# Patient Record
Sex: MALE | Race: WHITE | URBAN - METROPOLITAN AREA
[De-identification: names, ages, dates, MRNs, and addresses within clinical notes are randomized per-mention and may not be internally consistent; named-entity substitution may affect disease eponyms.]

---

## 2017-11-15 ENCOUNTER — HOSPITAL ENCOUNTER (INPATIENT)
Facility: CLINIC | Age: 77
LOS: 7 days | Discharge: HOME | DRG: 236 | End: 2017-11-22
Attending: SURGERY | Admitting: INTERNAL MEDICINE
Payer: MEDICARE

## 2017-11-15 ENCOUNTER — APPOINTMENT (OUTPATIENT)
Dept: GENERAL RADIOLOGY | Facility: CLINIC | Age: 77
DRG: 236 | End: 2017-11-15
Attending: SURGERY
Payer: MEDICARE

## 2017-11-15 ENCOUNTER — APPOINTMENT (OUTPATIENT)
Dept: GENERAL RADIOLOGY | Facility: CLINIC | Age: 77
DRG: 236 | End: 2017-11-15
Attending: INTERNAL MEDICINE
Payer: MEDICARE

## 2017-11-15 ENCOUNTER — APPOINTMENT (OUTPATIENT)
Dept: CT IMAGING | Facility: CLINIC | Age: 77
DRG: 236 | End: 2017-11-15
Attending: PHYSICIAN ASSISTANT
Payer: MEDICARE

## 2017-11-15 DIAGNOSIS — I25.110 CORONARY ARTERY DISEASE INVOLVING NATIVE CORONARY ARTERY OF NATIVE HEART WITH UNSTABLE ANGINA PECTORIS (H): Primary | ICD-10-CM

## 2017-11-15 DIAGNOSIS — Z95.1 S/P CABG (CORONARY ARTERY BYPASS GRAFT): ICD-10-CM

## 2017-11-15 DIAGNOSIS — I48.0 PAROXYSMAL ATRIAL FIBRILLATION (H): ICD-10-CM

## 2017-11-15 PROBLEM — I25.10 CAD (CORONARY ARTERY DISEASE): Status: ACTIVE | Noted: 2017-11-15

## 2017-11-15 LAB
ALBUMIN SERPL-MCNC: 3.4 G/DL (ref 3.4–5)
ALBUMIN UR-MCNC: 30 MG/DL
ALP SERPL-CCNC: 100 U/L (ref 40–150)
ALT SERPL W P-5'-P-CCNC: 37 U/L (ref 0–70)
ANION GAP SERPL CALCULATED.3IONS-SCNC: 5 MMOL/L (ref 3–14)
ANION GAP SERPL CALCULATED.3IONS-SCNC: 8 MMOL/L (ref 3–14)
ANION GAP SERPL CALCULATED.3IONS-SCNC: 9 MMOL/L (ref 3–14)
APPEARANCE UR: CLEAR
AST SERPL W P-5'-P-CCNC: 26 U/L (ref 0–45)
BASE EXCESS BLDV CALC-SCNC: 3.3 MMOL/L
BASOPHILS # BLD AUTO: 0 10E9/L (ref 0–0.2)
BASOPHILS # BLD AUTO: 0 10E9/L (ref 0–0.2)
BASOPHILS NFR BLD AUTO: 0.1 %
BASOPHILS NFR BLD AUTO: 0.2 %
BILIRUB SERPL-MCNC: 0.5 MG/DL (ref 0.2–1.3)
BILIRUB UR QL STRIP: NEGATIVE
BUN SERPL-MCNC: 12 MG/DL (ref 7–30)
BUN SERPL-MCNC: 12 MG/DL (ref 7–30)
BUN SERPL-MCNC: 14 MG/DL (ref 7–30)
CALCIUM SERPL-MCNC: 8.2 MG/DL (ref 8.5–10.1)
CALCIUM SERPL-MCNC: 8.6 MG/DL (ref 8.5–10.1)
CALCIUM SERPL-MCNC: 8.6 MG/DL (ref 8.5–10.1)
CHLORIDE SERPL-SCNC: 105 MMOL/L (ref 94–109)
CHLORIDE SERPL-SCNC: 107 MMOL/L (ref 94–109)
CHLORIDE SERPL-SCNC: 108 MMOL/L (ref 94–109)
CO2 SERPL-SCNC: 25 MMOL/L (ref 20–32)
CO2 SERPL-SCNC: 25 MMOL/L (ref 20–32)
CO2 SERPL-SCNC: 29 MMOL/L (ref 20–32)
COLOR UR AUTO: ABNORMAL
CREAT SERPL-MCNC: 0.69 MG/DL (ref 0.66–1.25)
CREAT SERPL-MCNC: 0.75 MG/DL (ref 0.66–1.25)
CREAT SERPL-MCNC: 0.79 MG/DL (ref 0.66–1.25)
DIFFERENTIAL METHOD BLD: ABNORMAL
DIFFERENTIAL METHOD BLD: ABNORMAL
EOSINOPHIL # BLD AUTO: 0.1 10E9/L (ref 0–0.7)
EOSINOPHIL # BLD AUTO: 0.1 10E9/L (ref 0–0.7)
EOSINOPHIL NFR BLD AUTO: 1.3 %
EOSINOPHIL NFR BLD AUTO: 1.8 %
ERYTHROCYTE [DISTWIDTH] IN BLOOD BY AUTOMATED COUNT: 13 % (ref 10–15)
ERYTHROCYTE [DISTWIDTH] IN BLOOD BY AUTOMATED COUNT: 13.1 % (ref 10–15)
ERYTHROCYTE [DISTWIDTH] IN BLOOD BY AUTOMATED COUNT: 13.1 % (ref 10–15)
GFR SERPL CREATININE-BSD FRML MDRD: >90 ML/MIN/1.7M2
GLUCOSE BLDC GLUCOMTR-MCNC: 93 MG/DL (ref 70–99)
GLUCOSE SERPL-MCNC: 106 MG/DL (ref 70–99)
GLUCOSE SERPL-MCNC: 113 MG/DL (ref 70–99)
GLUCOSE SERPL-MCNC: 127 MG/DL (ref 70–99)
GLUCOSE UR STRIP-MCNC: NEGATIVE MG/DL
HCO3 BLDV-SCNC: 28 MMOL/L (ref 21–28)
HCT VFR BLD AUTO: 36.2 % (ref 40–53)
HCT VFR BLD AUTO: 38.4 % (ref 40–53)
HCT VFR BLD AUTO: 38.8 % (ref 40–53)
HGB BLD-MCNC: 12 G/DL (ref 13.3–17.7)
HGB BLD-MCNC: 12.7 G/DL (ref 13.3–17.7)
HGB BLD-MCNC: 12.8 G/DL (ref 13.3–17.7)
HGB UR QL STRIP: ABNORMAL
IMM GRANULOCYTES # BLD: 0 10E9/L (ref 0–0.4)
IMM GRANULOCYTES # BLD: 0 10E9/L (ref 0–0.4)
IMM GRANULOCYTES NFR BLD: 0.1 %
IMM GRANULOCYTES NFR BLD: 0.3 %
INR PPP: 1.04 (ref 0.86–1.14)
INR PPP: 1.04 (ref 0.86–1.14)
INTERPRETATION ECG - MUSE: NORMAL
INTERPRETATION ECG - MUSE: NORMAL
KETONES UR STRIP-MCNC: 5 MG/DL
LEUKOCYTE ESTERASE UR QL STRIP: NEGATIVE
LMWH PPP CHRO-ACNC: 0.24 IU/ML
LMWH PPP CHRO-ACNC: 0.3 IU/ML
LMWH PPP CHRO-ACNC: 0.41 IU/ML
LYMPHOCYTES # BLD AUTO: 1.4 10E9/L (ref 0.8–5.3)
LYMPHOCYTES # BLD AUTO: 1.8 10E9/L (ref 0.8–5.3)
LYMPHOCYTES NFR BLD AUTO: 15.1 %
LYMPHOCYTES NFR BLD AUTO: 23 %
MAGNESIUM SERPL-MCNC: 2 MG/DL (ref 1.6–2.3)
MCH RBC QN AUTO: 30.4 PG (ref 26.5–33)
MCHC RBC AUTO-ENTMCNC: 33 G/DL (ref 31.5–36.5)
MCHC RBC AUTO-ENTMCNC: 33.1 G/DL (ref 31.5–36.5)
MCHC RBC AUTO-ENTMCNC: 33.1 G/DL (ref 31.5–36.5)
MCV RBC AUTO: 92 FL (ref 78–100)
MONOCYTES # BLD AUTO: 0.7 10E9/L (ref 0–1.3)
MONOCYTES # BLD AUTO: 0.7 10E9/L (ref 0–1.3)
MONOCYTES NFR BLD AUTO: 7.8 %
MONOCYTES NFR BLD AUTO: 9.3 %
MUCOUS THREADS #/AREA URNS LPF: PRESENT /LPF
NEUTROPHILS # BLD AUTO: 5.1 10E9/L (ref 1.6–8.3)
NEUTROPHILS # BLD AUTO: 7 10E9/L (ref 1.6–8.3)
NEUTROPHILS NFR BLD AUTO: 65.5 %
NEUTROPHILS NFR BLD AUTO: 75.5 %
NITRATE UR QL: NEGATIVE
NRBC # BLD AUTO: 0 10*3/UL
NRBC # BLD AUTO: 0 10*3/UL
NRBC BLD AUTO-RTO: 0 /100
NRBC BLD AUTO-RTO: 0 /100
O2/TOTAL GAS SETTING VFR VENT: NORMAL %
OXYHGB MFR BLDV: 79 %
PCO2 BLDV: 43 MM HG (ref 40–50)
PH BLDV: 7.43 PH (ref 7.32–7.43)
PH UR STRIP: 6 PH (ref 5–7)
PHENYTOIN SERPL-MCNC: 10.4 MG/L (ref 10–20)
PHOSPHATE SERPL-MCNC: 3.4 MG/DL (ref 2.5–4.5)
PLATELET # BLD AUTO: 158 10E9/L (ref 150–450)
PLATELET # BLD AUTO: 168 10E9/L (ref 150–450)
PLATELET # BLD AUTO: 177 10E9/L (ref 150–450)
PO2 BLDV: 44 MM HG (ref 25–47)
POTASSIUM SERPL-SCNC: 3.5 MMOL/L (ref 3.4–5.3)
POTASSIUM SERPL-SCNC: 3.7 MMOL/L (ref 3.4–5.3)
POTASSIUM SERPL-SCNC: 4 MMOL/L (ref 3.4–5.3)
PROT SERPL-MCNC: 7 G/DL (ref 6.8–8.8)
RBC # BLD AUTO: 3.95 10E12/L (ref 4.4–5.9)
RBC # BLD AUTO: 4.18 10E12/L (ref 4.4–5.9)
RBC # BLD AUTO: 4.21 10E12/L (ref 4.4–5.9)
RBC #/AREA URNS AUTO: 171 /HPF (ref 0–2)
SODIUM SERPL-SCNC: 139 MMOL/L (ref 133–144)
SODIUM SERPL-SCNC: 140 MMOL/L (ref 133–144)
SODIUM SERPL-SCNC: 141 MMOL/L (ref 133–144)
SOURCE: ABNORMAL
SP GR UR STRIP: 1.03 (ref 1–1.03)
TROPONIN I SERPL-MCNC: 0.02 UG/L (ref 0–0.04)
UROBILINOGEN UR STRIP-MCNC: NORMAL MG/DL (ref 0–2)
WBC # BLD AUTO: 10 10E9/L (ref 4–11)
WBC # BLD AUTO: 7.7 10E9/L (ref 4–11)
WBC # BLD AUTO: 9.3 10E9/L (ref 4–11)
WBC #/AREA URNS AUTO: 0 /HPF (ref 0–2)

## 2017-11-15 PROCEDURE — 80185 ASSAY OF PHENYTOIN TOTAL: CPT | Performed by: STUDENT IN AN ORGANIZED HEALTH CARE EDUCATION/TRAINING PROGRAM

## 2017-11-15 PROCEDURE — 85520 HEPARIN ASSAY: CPT | Performed by: THORACIC SURGERY (CARDIOTHORACIC VASCULAR SURGERY)

## 2017-11-15 PROCEDURE — 84484 ASSAY OF TROPONIN QUANT: CPT | Performed by: STUDENT IN AN ORGANIZED HEALTH CARE EDUCATION/TRAINING PROGRAM

## 2017-11-15 PROCEDURE — 25000125 ZZHC RX 250: Performed by: INTERNAL MEDICINE

## 2017-11-15 PROCEDURE — 80048 BASIC METABOLIC PNL TOTAL CA: CPT | Performed by: THORACIC SURGERY (CARDIOTHORACIC VASCULAR SURGERY)

## 2017-11-15 PROCEDURE — 84100 ASSAY OF PHOSPHORUS: CPT | Performed by: THORACIC SURGERY (CARDIOTHORACIC VASCULAR SURGERY)

## 2017-11-15 PROCEDURE — 40000275 ZZH STATISTIC RCP TIME EA 10 MIN

## 2017-11-15 PROCEDURE — 40000986 XR CHEST PORT 1 VW

## 2017-11-15 PROCEDURE — 40000076 ZZH STATISTIC IABP MONITORING

## 2017-11-15 PROCEDURE — 82805 BLOOD GASES W/O2 SATURATION: CPT | Performed by: INTERNAL MEDICINE

## 2017-11-15 PROCEDURE — 25000132 ZZH RX MED GY IP 250 OP 250 PS 637: Mod: GY | Performed by: INTERNAL MEDICINE

## 2017-11-15 PROCEDURE — 99223 1ST HOSP IP/OBS HIGH 75: CPT | Performed by: INTERNAL MEDICINE

## 2017-11-15 PROCEDURE — 85610 PROTHROMBIN TIME: CPT | Performed by: STUDENT IN AN ORGANIZED HEALTH CARE EDUCATION/TRAINING PROGRAM

## 2017-11-15 PROCEDURE — 83735 ASSAY OF MAGNESIUM: CPT | Performed by: STUDENT IN AN ORGANIZED HEALTH CARE EDUCATION/TRAINING PROGRAM

## 2017-11-15 PROCEDURE — 36569 INSJ PICC 5 YR+ W/O IMAGING: CPT

## 2017-11-15 PROCEDURE — 93010 ELECTROCARDIOGRAM REPORT: CPT | Mod: 76 | Performed by: INTERNAL MEDICINE

## 2017-11-15 PROCEDURE — 85520 HEPARIN ASSAY: CPT | Performed by: INTERNAL MEDICINE

## 2017-11-15 PROCEDURE — 25000128 H RX IP 250 OP 636: Performed by: INTERNAL MEDICINE

## 2017-11-15 PROCEDURE — 93005 ELECTROCARDIOGRAM TRACING: CPT

## 2017-11-15 PROCEDURE — 25000132 ZZH RX MED GY IP 250 OP 250 PS 637: Mod: GY | Performed by: STUDENT IN AN ORGANIZED HEALTH CARE EDUCATION/TRAINING PROGRAM

## 2017-11-15 PROCEDURE — 85027 COMPLETE CBC AUTOMATED: CPT | Performed by: STUDENT IN AN ORGANIZED HEALTH CARE EDUCATION/TRAINING PROGRAM

## 2017-11-15 PROCEDURE — S0138 FINASTERIDE, 5 MG: HCPCS | Performed by: STUDENT IN AN ORGANIZED HEALTH CARE EDUCATION/TRAINING PROGRAM

## 2017-11-15 PROCEDURE — 85025 COMPLETE CBC W/AUTO DIFF WBC: CPT | Performed by: STUDENT IN AN ORGANIZED HEALTH CARE EDUCATION/TRAINING PROGRAM

## 2017-11-15 PROCEDURE — 36415 COLL VENOUS BLD VENIPUNCTURE: CPT | Performed by: INTERNAL MEDICINE

## 2017-11-15 PROCEDURE — 80053 COMPREHEN METABOLIC PANEL: CPT | Performed by: STUDENT IN AN ORGANIZED HEALTH CARE EDUCATION/TRAINING PROGRAM

## 2017-11-15 PROCEDURE — 40000281 ZZH STATISTIC TRANSPORT TIME EA 15 MIN

## 2017-11-15 PROCEDURE — 25000128 H RX IP 250 OP 636: Performed by: STUDENT IN AN ORGANIZED HEALTH CARE EDUCATION/TRAINING PROGRAM

## 2017-11-15 PROCEDURE — 40000141 ZZH STATISTIC PERIPHERAL IV START W/O US GUIDANCE

## 2017-11-15 PROCEDURE — 25000128 H RX IP 250 OP 636

## 2017-11-15 PROCEDURE — 83735 ASSAY OF MAGNESIUM: CPT | Performed by: THORACIC SURGERY (CARDIOTHORACIC VASCULAR SURGERY)

## 2017-11-15 PROCEDURE — 81001 URINALYSIS AUTO W/SCOPE: CPT | Performed by: STUDENT IN AN ORGANIZED HEALTH CARE EDUCATION/TRAINING PROGRAM

## 2017-11-15 PROCEDURE — 80048 BASIC METABOLIC PNL TOTAL CA: CPT | Performed by: STUDENT IN AN ORGANIZED HEALTH CARE EDUCATION/TRAINING PROGRAM

## 2017-11-15 PROCEDURE — 36415 COLL VENOUS BLD VENIPUNCTURE: CPT | Performed by: STUDENT IN AN ORGANIZED HEALTH CARE EDUCATION/TRAINING PROGRAM

## 2017-11-15 PROCEDURE — 85610 PROTHROMBIN TIME: CPT | Performed by: INTERNAL MEDICINE

## 2017-11-15 PROCEDURE — 84100 ASSAY OF PHOSPHORUS: CPT | Performed by: STUDENT IN AN ORGANIZED HEALTH CARE EDUCATION/TRAINING PROGRAM

## 2017-11-15 PROCEDURE — 20000004 ZZH R&B ICU UMMC

## 2017-11-15 PROCEDURE — 85025 COMPLETE CBC W/AUTO DIFF WBC: CPT | Performed by: THORACIC SURGERY (CARDIOTHORACIC VASCULAR SURGERY)

## 2017-11-15 PROCEDURE — A9270 NON-COVERED ITEM OR SERVICE: HCPCS | Mod: GY | Performed by: STUDENT IN AN ORGANIZED HEALTH CARE EDUCATION/TRAINING PROGRAM

## 2017-11-15 PROCEDURE — 71250 CT THORAX DX C-: CPT

## 2017-11-15 PROCEDURE — A9270 NON-COVERED ITEM OR SERVICE: HCPCS | Mod: GY | Performed by: INTERNAL MEDICINE

## 2017-11-15 PROCEDURE — 27210197 ZZH KIT POWER PICC TRIPLE LUMEN

## 2017-11-15 PROCEDURE — 00000146 ZZHCL STATISTIC GLUCOSE BY METER IP

## 2017-11-15 PROCEDURE — 85520 HEPARIN ASSAY: CPT | Performed by: STUDENT IN AN ORGANIZED HEALTH CARE EDUCATION/TRAINING PROGRAM

## 2017-11-15 RX ORDER — TAMSULOSIN HYDROCHLORIDE 0.4 MG/1
0.4 CAPSULE ORAL DAILY
Status: DISCONTINUED | OUTPATIENT
Start: 2017-11-15 | End: 2017-11-15

## 2017-11-15 RX ORDER — LIDOCAINE 40 MG/G
CREAM TOPICAL
Status: DISCONTINUED | OUTPATIENT
Start: 2017-11-15 | End: 2017-11-17

## 2017-11-15 RX ORDER — ATORVASTATIN CALCIUM 80 MG/1
80 TABLET, FILM COATED ORAL DAILY
Status: DISCONTINUED | OUTPATIENT
Start: 2017-11-15 | End: 2017-11-17

## 2017-11-15 RX ORDER — HEPARIN SODIUM,PORCINE 10 UNIT/ML
2-5 VIAL (ML) INTRAVENOUS
Status: DISCONTINUED | OUTPATIENT
Start: 2017-11-15 | End: 2017-11-17

## 2017-11-15 RX ORDER — ASPIRIN 81 MG/1
81 TABLET, CHEWABLE ORAL DAILY
Status: ON HOLD | COMMUNITY
End: 2017-11-22

## 2017-11-15 RX ORDER — POTASSIUM CHLORIDE 1.5 G/1.58G
20-40 POWDER, FOR SOLUTION ORAL
Status: DISCONTINUED | OUTPATIENT
Start: 2017-11-15 | End: 2017-11-17

## 2017-11-15 RX ORDER — FINASTERIDE 5 MG/1
5 TABLET, FILM COATED ORAL DAILY
Status: DISCONTINUED | OUTPATIENT
Start: 2017-11-15 | End: 2017-11-17

## 2017-11-15 RX ORDER — HEPARIN SODIUM,PORCINE 10 UNIT/ML
5-10 VIAL (ML) INTRAVENOUS
Status: DISCONTINUED | OUTPATIENT
Start: 2017-11-15 | End: 2017-11-17

## 2017-11-15 RX ORDER — POTASSIUM CL/LIDO/0.9 % NACL 10MEQ/0.1L
10 INTRAVENOUS SOLUTION, PIGGYBACK (ML) INTRAVENOUS
Status: DISCONTINUED | OUTPATIENT
Start: 2017-11-15 | End: 2017-11-17

## 2017-11-15 RX ORDER — POTASSIUM CHLORIDE 7.45 MG/ML
10 INJECTION INTRAVENOUS
Status: DISCONTINUED | OUTPATIENT
Start: 2017-11-15 | End: 2017-11-17

## 2017-11-15 RX ORDER — CALCIUM CARBONATE 500 MG/1
500 TABLET, CHEWABLE ORAL 2 TIMES DAILY PRN
Status: DISCONTINUED | OUTPATIENT
Start: 2017-11-15 | End: 2017-11-17

## 2017-11-15 RX ORDER — NALOXONE HYDROCHLORIDE 0.4 MG/ML
.1-.4 INJECTION, SOLUTION INTRAMUSCULAR; INTRAVENOUS; SUBCUTANEOUS
Status: DISCONTINUED | OUTPATIENT
Start: 2017-11-15 | End: 2017-11-17

## 2017-11-15 RX ORDER — PROCHLORPERAZINE 25 MG
12.5 SUPPOSITORY, RECTAL RECTAL EVERY 12 HOURS PRN
Status: DISCONTINUED | OUTPATIENT
Start: 2017-11-15 | End: 2017-11-17

## 2017-11-15 RX ORDER — AMOXICILLIN 250 MG
1 CAPSULE ORAL 2 TIMES DAILY
Status: DISCONTINUED | OUTPATIENT
Start: 2017-11-15 | End: 2017-11-17

## 2017-11-15 RX ORDER — HEPARIN SODIUM,PORCINE 10 UNIT/ML
5-10 VIAL (ML) INTRAVENOUS EVERY 24 HOURS
Status: DISCONTINUED | OUTPATIENT
Start: 2017-11-15 | End: 2017-11-17

## 2017-11-15 RX ORDER — PHENYTOIN SODIUM 100 MG/1
400 CAPSULE, EXTENDED RELEASE ORAL AT BEDTIME
Status: DISCONTINUED | OUTPATIENT
Start: 2017-11-15 | End: 2017-11-17

## 2017-11-15 RX ORDER — POLYETHYLENE GLYCOL 3350 17 G/17G
17 POWDER, FOR SOLUTION ORAL DAILY PRN
Status: DISCONTINUED | OUTPATIENT
Start: 2017-11-15 | End: 2017-11-17

## 2017-11-15 RX ORDER — POTASSIUM CHLORIDE 750 MG/1
20-40 TABLET, EXTENDED RELEASE ORAL
Status: DISCONTINUED | OUTPATIENT
Start: 2017-11-15 | End: 2017-11-17

## 2017-11-15 RX ORDER — ASPIRIN 81 MG/1
81 TABLET ORAL DAILY
Status: DISCONTINUED | OUTPATIENT
Start: 2017-11-15 | End: 2017-11-17

## 2017-11-15 RX ORDER — SODIUM CHLORIDE 9 MG/ML
INJECTION, SOLUTION INTRAVENOUS CONTINUOUS
Status: ACTIVE | OUTPATIENT
Start: 2017-11-15 | End: 2017-11-15

## 2017-11-15 RX ORDER — TAMSULOSIN HYDROCHLORIDE 0.4 MG/1
0.4 CAPSULE ORAL AT BEDTIME
Status: DISCONTINUED | OUTPATIENT
Start: 2017-11-15 | End: 2017-11-17

## 2017-11-15 RX ORDER — POTASSIUM CHLORIDE 29.8 MG/ML
20 INJECTION INTRAVENOUS
Status: DISCONTINUED | OUTPATIENT
Start: 2017-11-15 | End: 2017-11-17

## 2017-11-15 RX ORDER — CALCIUM CARBONATE 500 MG/1
TABLET, CHEWABLE ORAL
COMMUNITY

## 2017-11-15 RX ORDER — MAGNESIUM SULFATE HEPTAHYDRATE 40 MG/ML
4 INJECTION, SOLUTION INTRAVENOUS EVERY 4 HOURS PRN
Status: DISCONTINUED | OUTPATIENT
Start: 2017-11-15 | End: 2017-11-17

## 2017-11-15 RX ORDER — NITROGLYCERIN 20 MG/100ML
0.07-2 INJECTION INTRAVENOUS CONTINUOUS
Status: DISCONTINUED | OUTPATIENT
Start: 2017-11-15 | End: 2017-11-17

## 2017-11-15 RX ORDER — NITROGLYCERIN 20 MG/100ML
INJECTION INTRAVENOUS
Status: COMPLETED
Start: 2017-11-15 | End: 2017-11-15

## 2017-11-15 RX ORDER — HEPARIN SODIUM 10000 [USP'U]/100ML
0-3500 INJECTION, SOLUTION INTRAVENOUS CONTINUOUS
Status: DISCONTINUED | OUTPATIENT
Start: 2017-11-15 | End: 2017-11-17

## 2017-11-15 RX ORDER — ACETAMINOPHEN 325 MG/1
650 TABLET ORAL EVERY 4 HOURS PRN
Status: DISCONTINUED | OUTPATIENT
Start: 2017-11-15 | End: 2017-11-17

## 2017-11-15 RX ORDER — AMOXICILLIN 250 MG
2 CAPSULE ORAL 2 TIMES DAILY
Status: DISCONTINUED | OUTPATIENT
Start: 2017-11-15 | End: 2017-11-17

## 2017-11-15 RX ORDER — PROCHLORPERAZINE MALEATE 5 MG
5 TABLET ORAL EVERY 6 HOURS PRN
Status: DISCONTINUED | OUTPATIENT
Start: 2017-11-15 | End: 2017-11-17

## 2017-11-15 RX ADMIN — POTASSIUM CHLORIDE 20 MEQ: 1.5 POWDER, FOR SOLUTION ORAL at 18:26

## 2017-11-15 RX ADMIN — FINASTERIDE 5 MG: 5 TABLET, FILM COATED ORAL at 07:56

## 2017-11-15 RX ADMIN — SODIUM CHLORIDE: 9 INJECTION, SOLUTION INTRAVENOUS at 08:06

## 2017-11-15 RX ADMIN — SENNOSIDES AND DOCUSATE SODIUM 1 TABLET: 8.6; 5 TABLET ORAL at 20:54

## 2017-11-15 RX ADMIN — ACETAMINOPHEN 650 MG: 325 TABLET, FILM COATED ORAL at 17:07

## 2017-11-15 RX ADMIN — HEPARIN SODIUM 950 UNITS/HR: 10000 INJECTION, SOLUTION INTRAVENOUS at 00:42

## 2017-11-15 RX ADMIN — ACETAMINOPHEN 650 MG: 325 TABLET, FILM COATED ORAL at 21:58

## 2017-11-15 RX ADMIN — TAMSULOSIN HYDROCHLORIDE 0.4 MG: 0.4 CAPSULE ORAL at 21:58

## 2017-11-15 RX ADMIN — ATORVASTATIN CALCIUM 80 MG: 80 TABLET, FILM COATED ORAL at 07:54

## 2017-11-15 RX ADMIN — NITROGLYCERIN 0.3 MCG/KG/MIN: 20 INJECTION INTRAVENOUS at 00:42

## 2017-11-15 RX ADMIN — ASPIRIN 81 MG: 81 TABLET, COATED ORAL at 07:55

## 2017-11-15 RX ADMIN — ACETAMINOPHEN 650 MG: 325 TABLET, FILM COATED ORAL at 07:54

## 2017-11-15 RX ADMIN — OXYCODONE HYDROCHLORIDE 5 MG: 5 TABLET ORAL at 08:30

## 2017-11-15 RX ADMIN — SODIUM CHLORIDE 250 ML: 9 INJECTION, SOLUTION INTRAVENOUS at 04:45

## 2017-11-15 RX ADMIN — PHENYTOIN SODIUM 400 MG: 100 CAPSULE ORAL at 21:58

## 2017-11-15 RX ADMIN — SODIUM CHLORIDE 250 ML: 9 INJECTION, SOLUTION INTRAVENOUS at 06:36

## 2017-11-15 RX ADMIN — ACETAMINOPHEN 650 MG: 325 TABLET, FILM COATED ORAL at 01:15

## 2017-11-15 RX ADMIN — OMEPRAZOLE 40 MG: 20 CAPSULE, DELAYED RELEASE ORAL at 07:54

## 2017-11-15 RX ADMIN — Medication 3 ML: at 09:54

## 2017-11-15 ASSESSMENT — ACTIVITIES OF DAILY LIVING (ADL)
COGNITION: 0 - NO COGNITION ISSUES REPORTED
DRESS: 0-->INDEPENDENT
BATHING: 0-->INDEPENDENT
RETIRED_COMMUNICATION: 0-->UNDERSTANDS/COMMUNICATES WITHOUT DIFFICULTY
AMBULATION: 0-->INDEPENDENT
RETIRED_EATING: 0-->INDEPENDENT
TRANSFERRING: 0-->INDEPENDENT
FALL_HISTORY_WITHIN_LAST_SIX_MONTHS: NO
SWALLOWING: 0-->SWALLOWS FOODS/LIQUIDS WITHOUT DIFFICULTY
TOILETING: 0-->INDEPENDENT

## 2017-11-15 ASSESSMENT — PAIN DESCRIPTION - DESCRIPTORS
DESCRIPTORS: SORE

## 2017-11-15 NOTE — CONSULTS
Cardiothoracic Surgery Consult Note    Reason for Consult: Left main coronary artery disease, CABG    HPI: Patient is a 77 year old male with a history of seizure disorder (no sz >20 yrs), BPH, dyslipidemia and Rob's esophagus who was admitted to [facility] for abnormal stress echo, underwent coronary angiogram which showed severe left main and was transferred to [facility] for consideration of CABG. Prior to transfer, patient was started on heparin and nitro drips and a balloon pump was placed. Patient received a ticagelor load, last dose was 11/14    Patient presented with substernal chest pain worse with exertion. He currently denies any chest pain. He reports his pain is worse with minimal exertion. He is usually very active without limitations. He denies any associated shortness of breath, dizziness, diaphoresis, nausea, vomiting, lightheadedness and dizziness. Patient's sister and father both have significant heart disease. He has never smoked.     Per stress test report, LVEF 50% at rest, LVEF dropped to 35% with stress, along with mid-apical, anterior, mid inferolateral, midanterolateral, apical septal and apical lateral wall motion abnormalities.       PMH:  Past Medical History:   Diagnosis Date     BPH (benign prostatic hyperplasia)      Cholelithiasis      Seizure disorder (H)        PSH:  Past Surgical History:   Procedure Laterality Date     APPENDECTOMY       CHOLECYSTECTOMY       COLONOSCOPY  03/16/2017     UPPER GI ENDOSCOPY  03/16/2017     VASECTOMY         FH:  Family History   Problem Relation Age of Onset     Hypertension Mother      Hyperlipidemia Mother      CEREBROVASCULAR DISEASE Father      HEART DISEASE Father      Prostate Cancer Father      Hypertension Father      HEART DISEASE Sister      Rheumatic heart disease     HEART DISEASE Maternal Grandfather      CANCER Paternal Uncle      CANCER Paternal Aunt      HEART DISEASE Maternal Uncle      HEART DISEASE Maternal Uncle         SH:  Social History     Social History     Marital status:      Spouse name: Soo     Number of children: 3     Years of education: 10     Occupational History     [employer]      Social History Main Topics     Smoking status: Never Smoker     Smokeless tobacco: Never Used     Alcohol use No     Drug use: No     Sexual activity: Not Asked     Other Topics Concern     None     Social History Narrative     None       Home Meds:  No prescriptions prior to admission.       Allergies:  No Known Allergies    ROS: No fevers, chills, or night sweats. No recent weight changes.  No new visual or hearing complaints. No sore throat or nasal congestion. No SOB, cough, or wheezing.  No CP, palpitations, syncopal episodes, or dependent edema.  No new muscle or joint pain.  No weakness, numbness, or tingling of extremities. No new headaches. No changes in memory, mood, or affect.  No new rashes or bruises.     Physical Exam:  Temp:  [98.9  F (37.2  C)] 98.9  F (37.2  C)  Heart Rate:  [49-70] 54  Resp:  [10-37] 16  BP: ()/(45-83) 108/45  SpO2:  [94 %-99 %] 98 %  Gen: NAD, resting comfortably in bed, conversational  HEENT: normocephalic, atraumatic cranium, EOMI, sclerae anicteric. Oral mucosa pink and moist, no tonsillar edema or erythema, midline trachea, nonpalpable thyroid  Lungs: Lungs CTA in all fields, no wheezing or rhonchi  CV: RRR, S1S2 normal, no murmur. Radial pulses and DP pulses symmetric. No dependent edema. Extensive varicosities of the right lower extremities.   Abd: positive normal pitched bowel sounds, overall soft and non distended, nontender, no hepatosplenomegaly, no masses/guarding/rebound tenderness.   Musculoskeletal: grossly intact, strength 5/5 upper and lower extremities  Neuro: AOx3, CN II-VII grossly intact, sensation/motor intact in upper and lower extremities  Mental: normal mood and affect, regular rate of speech    Labs:  ABG No lab results found in last 7 days.  CBC  Recent  Labs  Lab 11/15/17  0346 11/15/17  0040   WBC 10.0 9.3   HGB 12.7* 12.8*    168     BMP  Recent Labs  Lab 11/15/17  0346 11/15/17  0040    139   POTASSIUM 4.0 3.7   CHLORIDE 108 105   CO2 25 25   BUN 14 12   CR 0.79 0.75   * 113*     LFT  Recent Labs  Lab 11/15/17  0303 11/15/17  0040   AST  --  26   ALT  --  37   ALKPHOS  --  100   BILITOTAL  --  0.5   ALBUMIN  --  3.4   INR 1.04 1.04     PancreasNo lab results found in last 7 days.    Imaging:  CXR 11/15:  Findings:   Portable AP supine view of the chest. New right-sided PICC projecting  toward the high SVC. Intraaorta balloon pump is 2.4 cm above the  shirley at the level of aortic arch. No pleural effusion or  pneumothorax. Cardiomediastinal silhouette appears within normal  limits. Trachea midline. No acute airspace opacity. Surgical clips in  the right upper quadrant.       Impression:   1. Right-sided PICC projecting over the high SVC.  2. IABP marker is 2.4 cm above the shirley at the level of the aortic  Arch.    Stress results: Duration of exercise was 7 min and 10 sec. Maximal work rate was 5.1 METs. Target heart rate is 120 bpm. Maximal heart rate during stress was 101 bpm (71%) of maximal predicted heart rate). The rate-pressure product for the peak heart rate and blood pressure was 85143. The patient experienced 5 out of 10 chest tightness during stress. The stress test was terminated due to moderate chest discomfort and electrocardiographic changes.  ECG conclusions: The stress ECG was consistent with myocardial ischemia.   Baseline: There was severe hypokinesis of the apical septal, apical lateral, and apical wall(s).  Peak stress: There was severe hypokinesis of mid-apical anterior, mid inferolateral, mid anterolateral, apical septal, and apical lateral wall(s).   The left ventricle was mildly enlarged. There was moderate reduction in LV function. Estimated left ventricular ejection fraction was 35%.   Arrhythmia during stress:  isolated atrial premature beats, isolated premature ventricular beats, and pairs of premature ventricular beats.      Imaging data  Baseline: There was severe hypokinesis of the apical septal, apical lateral, and apical wall(s). Left ventricular size was normal. Estimated left ventricular ejection fraction was 50%.   Peak stress: There was severe hypokinesis of the mid-apical anterior, mid inferolateral, mid anterolateral, apical septal, and apical lateral wall(s). The left ventricle was mildly enlarged. There was a moderate reduction in LV function. Estimated left ventricular ejection fraction was 35%.      Last Angiogram:   11/14/2017 @ [place]:   ((Reviewed by cardiology fellow))  Right dominant system.   95% stenosis of distal left main extending into Lcx w/ MENDOZA 3 flow  95% stenosis of ostium of LAD  Mild-moderate calcification of pLAD  Moderate disease of pLAD, and moderate diffuse disease of m/dLAD        ASSESSMENT/PLAN: Patient is a 77 year old male with a history of seizure disorder (no sz >20 yrs), BPH, dyslipidemia and Rob's esophagus presented with to [facility] and was found to have critical left main stenosis without significant RCA disease. IABP was placed and patient was transferred to [facility]. Patient currently admitted under cardiology service and CVTS was consulted for consideration of CABG.  - Plan for OR on Friday late morning with Dr. Thompson and Dr. Sanchez, need to wait at least three days since patient received ticagelor dose  - Will obtain vein mapping, bilateral carotid duplex US and CT chest w/o contrast  - Also recommend obtaining formal echocardiogram to assess valves and EF  - Pre-op orders to be placed, consent to be obtained.   - Other cares per primary team  - Thank you for the opportunity to participate in the care of this patient.    Patient and plan discussed with attending, Dr. David Thompson.      Fred Gonzalez PA-C  Cardiothoracic Surgery  November 15, 2017 9:31 AM    p: [phone]     Patient seen and examined. Investigations reviewed. I agree with the findings outlined in the advanced care provider's note. Will proceed with coronary artery bypass grafting on Friday. Delay in surgery is due to Brilinta load. Risks and benefits of surgery discussed with patient including risks of death, bleeding, stroke, infection, renal failure. He understands and is willing to proceed with surgery. I spent a total of 45 minutes examining the patient, reviewing investigations and therapeutic counseling.

## 2017-11-15 NOTE — PHARMACY-ADMISSION MEDICATION HISTORY
Admission medication history interview status for the 11/15/2017 admission is complete. See Epic admission navigator for allergy information, pharmacy, prior to admission medications and immunization status.     Medication history interview sources:  Patient, [facility] faxed list    Changes made to PTA medication list (reason)  Added: All medications (no hx in chart)  Deleted: None  Changed: None    Additional medication history information (including reliability of information, actions taken by pharmacist):    Patient alert/oriented.  Knows the names of the meds he takes, but not doses.  He recently transferred from a hospital in [place] to [facility] early this week.    1. Verified allergies. NKDA.  2. All scheduled meds last taken on Monday due to hospital admission in [place].   3. Was prescribed 3 new cardiac meds from [place] MD Jordan Hatfield.  They have not been filled/picked up.  4. Had been using TUMs regularly (4-6 times daily) before hospital admission.  5. Recalled having a flu shot this year; [facility] lists 10/16/17.     Prior to Admission medications    Medication Sig Last Dose Taking? Auth Provider   METOPROLOL TARTRATE PO Take 12.5 mg by mouth 2 times daily Not started No Unknown, Entered By History   ATORVASTATIN CALCIUM PO Take 80 mg by mouth daily Not started No Unknown, Entered By History   aspirin 81 MG chewable tablet Take 81 mg by mouth daily Not started No Unknown, Entered By History   OMEPRAZOLE PO Take 40 mg by mouth every morning Take 30 minutes before breakfast 11/13/2017 at AM Yes Unknown, Entered By History   FINASTERIDE PO Take 1 mg by mouth daily 11/13/2017 at Unknown time Yes Unknown, Entered By History   TAMSULOSIN HCL PO Take 0.4 mg by mouth At Bedtime For urination 11/12/2017 at HS Yes Unknown, Entered By History   PHENYTOIN SODIUM EXTENDED PO Take 400 mg by mouth daily 11/13/2017 at Unknown time Yes Unknown, Entered By History   calcium carbonate (TUMS) 500 MG chewable  tablet Take 1 tablet (500 mg) by mouth 4-6 times daily as needed for reflux 11/13/2017 at Unknown time Yes Unknown, Entered By History     Medication history completed by:     Ghazal Kaplan  Pharmacy APPE Student

## 2017-11-15 NOTE — LETTER
Transition Communication Hand-off for Care Transitions to Next Level of Care Provider    Name: Luis Eduardo Oliver  MRN #: [id number]  Primary Care Provider: Criselda Santacruz     Primary Clinic: [place] [address] [address] [facility] [address]     Reason for Hospitalization:  S/P CABG x 2 [Z95.1]  Admit Date/Time: 11/15/2017 12:03 AM  Discharge Date: 11/22/17    Payor Source: Payor: MEDICARE / Plan: MEDICARE / Product Type: Medicare /            Reason for Communication Hand-off Referral: Fragility    Discharge Plan:     Home with outpatient cardiac rehab    Discharge Needs Assessment:  Needs       Most Recent Value    Equipment Currently Used at Home none    Transportation Available car, family or friend will provide            Follow-up plan:  No future appointments.    Any outstanding tests or procedures:        Referrals     Future Labs/Procedures    CARDIAC REHAB REFERRAL     Comments:    Your provider has referred you to:  [facility]  [address] [facility]  Phone #: [phone]    Cardiac Rehab Referral     Comments:    Your provider has referred you to: PREFERRED PROVIDERS in [facility]          Please see attached AVS for changes in medical plan of care and needs.  Sophia Nathan, RN, MSN, PHN  RNCC  PH: [phone]  Pager: [phone]        AVS/Discharge Summary is the source of truth; this is a helpful guide for improved communication of patient story

## 2017-11-15 NOTE — H&P
Cardiology History and Physical    Patient Name: Luis Eduardo Oliver MRN# [id number]   Age: 77 year old YOB: 1940     Date of Admission:11/15/2017    Primary care provider: No primary care provider on file.  Date of Service: 11/15/2017  Admitting Team: [facility]         Assessment and Plan:   Luis Eduardo Oliver is a 77 yr old man pmh of seizure disorder (no sz >20 yrs), BPH, and Rob's esophagus who was admitted to [facility] for abnormal stress echo, now transferred for consideration for CABG in setting of severe LM disease.     # Unstable angina  Severe left main disease. Hemodynamically stable. IABP placed prior to transfer. Loaded with ticagrelor on 11/14. Trops remained low and trended down at OSH, will recheck if chest pain recurs. Risk factors eval-ed at OSH (A1c 5.5%, Lipids wnl, TSH wnl)  -- Continue ASA 81, atorvastatin 80, metop tart 12.5,   -- IABP  -- CVTS consult for CABG in am  -- Heparin gtt  -- Hold ticagrelor  -- Nitro gtt titrate to chest pain    # Hx seizure disorder  No sz >20 yrs.   -- Phenytoin ER 400mg qhs  -- Phenytoin level in am    # BPH  -- PTA finasteride, tamsulosin    # Rob's esophagus  -- PTA omeprazole    FEN:   - No mIVF  - Replete lytes per protocol  - 2g Na / 2L fluid restriction // NPO at MN for possible procedure  Ppx:   - DVT: heparin gtt  - GI: PTA omeprazole    Code Status:  FULL CODE - discussed with patient at bedside, notes he does not want to be a vegetable.   Dispo/Admission Status: Admit to [facility], dispo pending CVTS evaluation and plan of care    Patient was discussed with Dr. Ryan Tomlin, cardiology fellow, and will be formally staffed in the AM.    Kia Brennan MD  Internal Medicine PGY-2  P: [phone]         Chief Complaint:   Chest pain         HPI:   Luis Eduardo Oliver is a 77 yr old man w/ pmh of seizure disorder (no sz >20 yrs), BPH, and Rob's esophagus who was admitted to [facility] for abnormal stress echo, now transferred for  consideration for CABG in setting of severe LM disease. Patient dx-ed w/ Rob's in 3/2017. States since EGD, he has had substernal CP with exertion that radiated to b/l arms and improved with rest. No associated SOB, dizziness, diaphoresis, n/t, n/v/d, or weakness. Previously was very active, walking up to 7 miles per day and carrying up to 40#. Lately activity has been limited by chest pain and GALLARDO. Denies any orthopnea, PND, abdominal bloating, or lower extremity edema. Was recently ill w/ flu-like sxs, now resolved. Has chronic dry cough from Rob's esophagus, no change. Has family history of cardiac disease as below. Is a never smoker, non-drinker.     He presented on 11/13 for outpatient stress echo that was noted to be severely abnormal and concerning for 3V disease. He was admitted for further evaluation. Angiogram performed on 11/14, report unavailable, but CD images sent with patient. Report was severe >90% stenosis of Left main. Balloon pump was placed and patient was transferred to [facility] for evaluation for CABG. Labs at OSH all wnl (BMP, CBC, LFTs, INR, lipids, A1c, TSH). Troponin trend was 0.01--0.03-->-0.02. He was started on heparin and nitro drips and is now pain free. He received ticagrelor load of 180, then started on 90 BID.          Past Medical History:     Past Medical History:   Diagnosis Date     BPH (benign prostatic hyperplasia)      Cholelithiasis      Seizure disorder (H)      Stress Echo:  11/13/2017 @ [place]:  Rest ECG  Normal sinus rhythm  Conclusions  Impressions  Markedly abnormal study. Findings suggest multiple vessel coronary artery disease.    Summary  Stress results: Duration of exercise was 7 min and 10 sec. Maximal work rate was 5.1 METs. Target heart rate is 120 bpm. Maximal heart rate during stress was 101 bpm (71%) of maximal predicted heart rate). The rate-pressure product for the peak heart rate and blood pressure was 49565. The patient experienced 5 out of  10 chest tightness during stress. The stress test was terminated due to moderate chest discomfort and electrocardiographic changes.  ECG conclusions: The stress ECG was consistent with myocardial ischemia.   Baseline: There was severe hypokinesis of the apical septal, apical lateral, and apical wall(s).  Peak stress: There was severe hypokinesis of mid-apical anterior, mid inferolateral, mid anterolateral, apical septal, and apical lateral wall(s).   The left ventricle was mildly enlarged. There was moderate reduction in LV function. Estimated left ventricular ejection fraction was 35%.   Arrhythmia during stress: isolated atrial premature beats, isolated premature ventricular beats, and pairs of premature ventricular beats.     Imaging data  Baseline: There was severe hypokinesis of the apical septal, apical lateral, and apical wall(s). Left ventricular size was normal. Estimated left ventricular ejection fraction was 50%.   Peak stress: There was severe hypokinesis of the mid-apical anterior, mid inferolateral, mid anterolateral, apical septal, and apical lateral wall(s). The left ventricle was mildly enlarged. There was a moderate reduction in LV function. Estimated left ventricular ejection fraction was 35%.      Last Angiogram:   11/14/2017 @ [place]:   ((Reviewed by cardiology fellow))  Right dominant system.   95% stenosis of distal left main extending into Lcx w/ MENDOZA 3 flow  95% stenosis of ostium of LAD  Mild-moderate calcification of pLAD  Moderate disease of pLAD, and moderate diffuse disease of m/dLAD  Last Nuc Med Study:          Past Surgical History:     Past Surgical History:   Procedure Laterality Date     APPENDECTOMY       CHOLECYSTECTOMY       COLONOSCOPY  03/16/2017     UPPER GI ENDOSCOPY  03/16/2017     VASECTOMY              Social History:     Social History     Social History     Marital status: N/A     Spouse name: Soo     Number of children: 3     Years of education: 10      Occupational History     [employer]      Social History Main Topics     Smoking status: Never Smoker     Smokeless tobacco: Never Used     Alcohol use No     Drug use: No     Sexual activity: Not on file     Other Topics Concern     Not on file     Social History Narrative     No narrative on file            Family History:     Family History   Problem Relation Age of Onset     Hypertension Mother      Hyperlipidemia Mother      CEREBROVASCULAR DISEASE Father      HEART DISEASE Father      Prostate Cancer Father      Hypertension Father      HEART DISEASE Sister      Rheumatic heart disease     HEART DISEASE Maternal Grandfather      CANCER Paternal Uncle      CANCER Paternal Aunt      HEART DISEASE Maternal Uncle      HEART DISEASE Maternal Uncle           Immunizations:     There is no immunization history on file for this patient.           Allergies:    No Known Allergies         Medications:     PTA meds:   - Finasteride 5mg qday  - Tamsulosin 0.4mg QHS  - Omeprazole 40mg qam  - Phenytoin ER 400mg QHS          Review of Systems:     A complete, 10 point ROS was performed and is negative other than what is stated in the HPI.         Physical Exam:   Blood pressure 95/83, temperature 98.9  F (37.2  C), temperature source Oral, resp. rate 13, weight 79.4 kg (175 lb), SpO2 98 %.  General: Pleasant elderly man, lying in bed in NAD  HEENT: NC/AT, PERRL, EOMI, MMM  Neck: Supple, no JVD  Chest/Resp: CTAB, no wheezes/rhonchi/crackles  Heart/CV: RRR, no m/r/g, sound of balloon pump heard well  Abdomen/GI: NABS, S/ND/NT  Extremities/MSK: No edema, 2+ peripheral pulses  Skin: No concerning rashes/lesions  Neuro: AOx3, CN's II-XII grossly intact  Psych: Appropriate mood/affect         Data:   Labs:   Na 139    Cl 105    BUN 12  K 3.7        CO2 25   Cr 0.75  Gluc 113    Alb 3.4  Tprot 7.0  Tbili 0.5  Alk Phos 100  AST 26  ALT 37    WBC 9.3  Hgb 12.8  Plt 168    INR 1.04    Imaging:   CXR 11/15/17:   Personally  reviewed - No acute airspace disease, no pulmonary edema, no effusions. Balloon pump appears to be in good position.       ATTENDING NOTE:  Patient has been seen and evaluated by me on 11/15/2017. I have reviewed the H&P.  Please refer to it for additional details.  I have reviewed today's vital signs, medications, labs, and imaging results.  I have reviewed and edited, as necessary, the history, review of systems, physical examination, and assessment and plan.  I have discussed my assessment and plan with the cardiology fellow.  Luis Eduardo Oliver is a 77 year old male with risk factor profile (+) HTN, (-) DM, (-) hypercholesterolemia, (-) tobacco use, (+) fam Hx premature CAD, was transferred to [facility] for elective CABG.  PMHx remarkable for Rob's esophagus.  He describes an 8 month history of exertional chest discomfort and dyspnea.  He had stress ECHO (11/13/17) showing abnormal baseline images. There was severe hypokinesis of the apical septal, apical lateral, and apical wall(s).  The LVEF was 50%. At peak stress, there was severe hypokinesis of the mid-apical anterior, mid inferolateral, mid anterolateral, apical septal, and apical lateral wall(s). The LVEF dropped to 35%.  Coronary angiography (11/14/17) showed a right dominant system, 95% stenosis of distal left main extending into LCx w/ MENDOZA 3 flow, 95% stenosis of ostium of LAD, mild-moderate calcification of pLAD, moderate disease of pLAD, and moderate diffuse disease of m/dLAD.   The patient received Ticagrelor at the outlying institution.  He was referred for CABG but an IABP was placed because CT Surgery felt the risk of bleeding associated with immediate CABG outweighed the potential benefit of earlier revascularization.  He is on IV Heparin, BB, ACE-I (started), and statin.  IV hydration today.    Brandon Rodgers MD     Cardiovascular Division

## 2017-11-15 NOTE — PLAN OF CARE
Problem: Patient Care Overview  Goal: Plan of Care/Patient Progress Review  Outcome: No Change  D: Recent failed stress test at + IABP placement at OSH  I/A: Alert/ oriented x4. Weaned 2L NC to RA, tolerating well with O2 sats >95%. Gave oxy x1 this morning for c/o back pain, pt reported pain resolved after. Denied chest pain, nitro remains on at 0.1-0.4. MAPs on cuff 60-70. IABP in augmented pressures 90s-80s/40s. HR SB-SR 50s-70s. Afebrile. Added regular diet, 2L FR. UO low overnight, increased this AM with 500 mL bolus and PO intake. UO now 30-75 mL/hr. Triple lumen PICC placed today in R arm, verified via xray.   P: Continue POC. Scheduled bedside ECHO tomorrow. CABG with Dr. Thompson on Friday.

## 2017-11-15 NOTE — PLAN OF CARE
Problem: Patient Care Overview  Goal: Plan of Care/Patient Progress Review  Outcome: No Change  Pt A&O x4. Chest pain well-managed with nitroglycerin drip, currently at 0.2 mcg/min. Pt only has PIV's so alternating site every 2 hours. Given Tylenol once for c/o back soreness. O2 at 1 lpm. LS clear, diminished in bases. Sinus rhythm/bradycardia with HR 50's-60s, pt briefly dropped to high 40's while sleeping. Having frequent PVC's and PAC's, though all electrolytes WNL. IABP site WNL, pulses 2+. Augmenting at 100%, 1:1. Assisted pressures 90's/40's with MAP's 70's. Left wrist site WDL, no hematoma or bleeding. CMS intact to all extremities. Urine output low, UA checked and 250 cc bolus given without results. Currently giving another 250 cc bolus. Urine was initially red-tinged after transport, but now looking more chirag. Continues on heparin drip at 800 units/hr.     Around 0400 pt started to feel anxious, said he just didn't feel good, but couldn't articulate why. Very restless and unable to lie still, experiencing frequent leg twitching. EKG done and showed no abnormalities. VBG results WNL. Other labs unremarkable. Not long after pt started to feel better, currently resting calmly.

## 2017-11-15 NOTE — IP AVS SNAPSHOT
[address] [address]    Phone:  [phone]                                       After Visit Summary   11/15/2017    Luis Eduardo Oliver    MRN: [id number]           After Visit Summary Signature Page     I have received my discharge instructions, and my questions have been answered. I have discussed any challenges I see with this plan with the nurse or doctor.    ..........................................................................................................................................  Patient/Patient Representative Signature      ..........................................................................................................................................  Patient Representative Print Name and Relationship to Patient    ..................................................               ................................................  Date                                            Time    ..........................................................................................................................................  Reviewed by Signature/Title    ...................................................              ..............................................  Date                                                            Time

## 2017-11-15 NOTE — IP AVS SNAPSHOT
MRN:[id number]                      After Visit Summary   11/15/2017    Luis Eduardo Oliver    MRN: [id number]           Thank you!     Thank you for choosing [facility] for your care. Our goal is always to provide you with excellent care. Hearing back from our patients is one way we can continue to improve our services. Please take a few minutes to complete the written survey that you may receive in the mail after you visit with us. Thank you!        Patient Information     Date Of Birth          1940        Designated Caregiver       Most Recent Value    Caregiver    Will someone help with your care after discharge? yes    Name of designated caregiver Soo Oliver    Phone number of caregiver [phone]    Caregiver address home w/ pt      About your hospital stay     You were admitted on:  November 15, 2017 You last received care in the:  [address] [facility]    You were discharged on:  November 22, 2017        Reason for your hospital stay       You were transferred to the [facility] on 11/15/17 for work-up and evaluation concerning severe coronary artery disease.    You had a 2 vessel coronary artery bypass with Dr Rod Sanchez on 11/17/17.                  Who to Call     For medical emergencies, please call 911.  For non-urgent questions about your medical care, please call your primary care provider or clinic, [phone]  For questions related to your surgery, please call your surgery clinic        Attending Provider     Provider Specialty    David Thompson MD [facility]    Brandon Rodgers MD Internal Medicine - Interventional Cardiology    Rod Sanchez MD Cardiology       Primary Care Provider Office Phone # Fax #    Criselda Santacruz [phone]       When to contact your care team       Call your primary doctor, cardiologist, or surgery team if you have any of the following: temperature greater than 101 F,  increased shortness of breath, increased  drainage, increased swelling or increased pain.                  After Care Instructions     Activity       Your activity upon discharge: activity as tolerated and no driving for 4 weeks.            Diet       Follow this diet upon discharge: Orders Placed This Encounter      Snacks/Supplements Adult: Ensure Plus (Adult); Between Meals      Low Saturated Fat Na <2400 mg            Monitor and record       blood pressure daily,   pulse daily,   weight every day            Wound care and dressings       Instructions to care for your wound at home:  You have dissolvable sutures under your skin that do not need to be removed.  Pat wound dressing dry after showers.  Skin glue will eventually fall off in 1-2 weeks.     Keep wound clean and dry, showers are okay after discharge, but don't let spray hit directly on incision. No baths or swimming for 1 month.  Clean wounds twice a day for 2 weeks with microklenz spray if available. Cover chest tube sites with gauze until they stop draining, then leave open.                  Follow-up Appointments     Follow Up and recommended labs and tests       Follow up with primary care provider, Criselda Santacruz, within 7 days to evaluate medication change, to evaluate treatment change, to evaluate after surgery, regarding new diagnosis and to follow up on results.  The following labs/tests are recommended: per PCP.    Follow up with primary care provider, Jordan Hatfield (cardiologist), to evaluate medication change, to evaluate after surgery and for hospital follow- up. No follow up labs or test are needed.                  Additional Services     CARDIAC REHAB REFERRAL       Your provider has referred you to:  [facility]  [address] [facility]  Phone #: [phone]            Cardiac Rehab Referral       Your provider has referred you to: PREFERRED PROVIDERS in [facility]                  Further instructions from your care team       [facility]  [facility]      AFTER YOU GO HOME FROM YOUR HEART SURGERY    Avoid lifting anything greater than ten pounds for 6 weeks after surgery and then less than 20 pounds for an additional 6 weeks.    No driving for 4 weeks after surgery or while on pain medication.     Avoid strenuous activities such as bowling, vacuuming, raking, shoveling, golf or tennis for 12 weeks after your surgery. It is okay to resume sex if you feel comfortable in doing so. You may have to try different positions with your partner.     Splint your chest incision by hugging a pillow or bringing your arms across your chest when coughing or sneezing. Avoid pushing off with your arms when getting up for the first month if you have had your sternum opened.    Shower or wash your incisions daily with soap and water (or as instructed), pat dry. Keep wound clean and dry, showers are okay after discharge, but don't let spray hit directly on incision. No baths or swimming for 1 month.  Clean wounds twice a day for 2 weeks with microklenz spray if available. Cover chest tube sites with gauze until they stop draining, then leave open. It is not abnormal for chest tube sites to drain yellowish/clear fluid for up to 2-3 weeks after surgery.   Watch for signs of infection: increased redness, tenderness, warmth or any drainage that appears infected (pus like) or is persistent.  Also a temperature > 100.5 F or chills. Call your surgeon or primary care provider's office immediately. Remove any skin glue left on incisions after 10 days. This will not affect your incision and can speed up healing.    Exercise is very important in your recovery. Please follow the guidelines set up for you in your cardiac rehab classes at the hospital. If outpatient cardiac rehab was ordered for you, we highly recommend you participate. If you have problems arranging your cardiac rehab, please call [phone].     Avoid sitting for prolonged periods of time, try  to walk every hour during the day. If you have a leg incision, elevate your leg often when you are not walking.    Check your weight when you get home from the hospital and continue to check it daily through your recovery for at least a month. If you notice a weight gain of 2-3 pounds in a week, notify your primary care physician, cardiologist or surgeon.    Bowel activity may be slow after surgery. If necessary, you may take an over the counter laxative such as Milk of Magnesia or Miralax. You may have stool softeners prescribed (docusate sodium, Senokot). We recommend using stool softeners while using narcotics for pain (oxycodone/percocet, hydrocodone/vicodin).      REGARDING PRESCRIPTION REFILLS.  If you need a refill on your pain medication contact us.  All other medications will be adjusted, discontinued and re-filled by your primary care physician and/or your cardiologist as they were prior to your surgery. We have given you enough for one to three month with possibly one refill.    POST-OPERATIVE CLINIC VISITS  You will now return to the care of your primary physician and your cardiologist.   It is important to call for an appointment to see your cardiologist in 2-3 weeks after surgery and see your primary care physician in 2-4 weeks after surgery. If there is a need to return to see CT Surgery please call our  at [phone].    SURGICAL QUESTIONS  Please call Sarah May with any surgical questions, her phone number is listed below.  She can assist you with your needs and contact other surgery care team members as indicated.    For general questions or concerns, please call the Cardiothoracic Surgery Department at [phone] 8-4:30 M-F.   On weekends or after hours, please call [phone] and ask the  to   page the Cardiothoracic Surgery fellow on call.      Thank you,    Your Cardiothoracic Surgery Team  Sarah May RN Care Coordinator-  [phone]   Cheri Mueller  "SHIRA Ibarra Claytonjosefa SILVA          Pending Results     Date and Time Order Name Status Description    2017 1226 Potassium In process     2017 1056 Plasma prepare order unit In process             Statement of Approval     Ordered          17 8814  I have reviewed and agree with all the recommendations and orders detailed in this document.  EFFECTIVE NOW     Approved and electronically signed by:  Hoang Belle PA             Admission Information     Date & Time Provider Department Dept. Phone    11/15/2017 Rod Sanchez MD [address] [facility] [phone]      Your Vitals Were     Blood Pressure Temperature Respirations Height Weight Pulse Oximetry    117/62 (BP Location: Left arm) 98.4  F (36.9  C) (Oral) 18 1.753 m (5' 9\") 75.7 kg (166 lb 12.8 oz) 97%    BMI (Body Mass Index)                   24.63 kg/m2           [facility] Information     [email or web address] lets you send messages to your doctor, view your test results, renew your prescriptions, schedule appointments and more. To sign up, go to [email or web address] . Click on \"Log in\" on the left side of the screen, which will take you to the Welcome page. Then click on \"Sign up Now\" on the right side of the page.     You will be asked to enter the access code listed below, as well as some personal information. Please follow the directions to create your username and password.     Your access code is: [email or web address]  Expires: 2018 11:01 AM     Your access code will  in 90 days. If you need help or a new code, please call your [place] clinic or [phone].        Care EveryWhere ID     This is your Care EveryWhere ID. This could be used by other organizations to access your [place] medical records  [id number]        Equal Access to Services     GOOD HERNANDEZ : Holland Chambers, wazabrina luqabdelrahman, qaybta kaalmawai green. So [facility] " [phone].    ATENCIÓN: Si lawrence haro, tiene a guerrero disposición servicios gratuitos de asistencia lingüística. Keisha sutton [phone].    We comply with applicable federal civil rights laws and [place] laws. We do not discriminate on the basis of race, color, national origin, age, disability, sex, sexual orientation, or gender identity.               Review of your medicines      START taking        Dose / Directions    acetaminophen 325 MG tablet   Commonly known as:  TYLENOL        Dose:  650 mg   Take 2 tablets (650 mg) by mouth every 6 hours as needed for other (surgical pain)   Quantity:  100 tablet   Refills:  0       aspirin 325 MG tablet   Replaces:  aspirin 81 MG chewable tablet        Dose:  325 mg   Start taking on:  11/23/2017   Take 1 tablet (325 mg) by mouth daily   Quantity:  120 tablet   Refills:  0       lisinopril 2.5 MG tablet   Commonly known as:  PRINIVIL/Zestril   Used for:  S/P CABG (coronary artery bypass graft)        Dose:  2.5 mg   Take 1 tablet (2.5 mg) by mouth daily   Quantity:  40 tablet   Refills:  0       magnesium gluconate 500 MG tablet   Commonly known as:  MAGONATE   Used for:  S/P CABG (coronary artery bypass graft), Paroxysmal atrial fibrillation (H)        Dose:  500 mg   Start taking on:  11/23/2017   Take 1 tablet (500 mg) by mouth daily for 14 days   Quantity:  14 tablet   Refills:  0       oxyCODONE IR 5 MG tablet   Commonly known as:  ROXICODONE        Dose:  5-10 mg   Take 1-2 tablets (5-10 mg) by mouth every 4 hours as needed for moderate to severe pain   Quantity:  50 tablet   Refills:  0       senna-docusate 8.6-50 MG per tablet   Commonly known as:  SENOKOT-S;PERICOLACE   Used for:  S/P CABG (coronary artery bypass graft)        Dose:  2 tablet   Take 2 tablets by mouth 2 times daily as needed for constipation   Quantity:  50 tablet   Refills:  0         CONTINUE these medicines which may have CHANGED, or have new prescriptions. If we are uncertain of the size  of tablets/capsules you have at home, strength may be listed as something that might have changed.        Dose / Directions    metoprolol 50 MG tablet   Commonly known as:  LOPRESSOR   This may have changed:    - medication strength  - how much to take  - when to take this   Used for:  S/P CABG (coronary artery bypass graft)        Dose:  50 mg   Take 1 tablet (50 mg) by mouth every 12 hours   Quantity:  80 tablet   Refills:  0         CONTINUE these medicines which have NOT CHANGED        Dose / Directions    ATORVASTATIN CALCIUM PO        Dose:  80 mg   Take 80 mg by mouth daily   Refills:  0       calcium carbonate 500 MG chewable tablet   Commonly known as:  TUMS        Take 1 tablet (500 mg) by mouth 4-6 times daily as needed for reflux   Refills:  0       FINASTERIDE PO        Dose:  1 mg   Take 1 mg by mouth daily   Refills:  0       OMEPRAZOLE PO        Dose:  40 mg   Take 40 mg by mouth every morning Take 30 minutes before breakfast   Refills:  0       PHENYTOIN SODIUM EXTENDED PO        Dose:  400 mg   Take 400 mg by mouth daily   Refills:  0       TAMSULOSIN HCL PO        Dose:  0.4 mg   Take 0.4 mg by mouth At Bedtime For urination   Refills:  0         STOP taking     aspirin 81 MG chewable tablet   Replaced by:  aspirin 325 MG tablet                Where to get your medicines      These medications were sent to [facility] - [place], [place] - [address]  [address] [address]     Phone:  [phone]     acetaminophen 325 MG tablet    aspirin 325 MG tablet    lisinopril 2.5 MG tablet    magnesium gluconate 500 MG tablet    metoprolol 50 MG tablet    senna-docusate 8.6-50 MG per tablet         Some of these will need a paper prescription and others can be bought over the counter. Ask your nurse if you have questions.     Bring a paper prescription for each of these medications     oxyCODONE IR 5 MG tablet                Protect others around you: Learn how  to safely use, store and throw away your medicines at www.disposemymeds.org.             Medication List: This is a list of all your medications and when to take them. Check marks below indicate your daily home schedule. Keep this list as a reference.      Medications           Morning Afternoon Evening Bedtime As Needed    acetaminophen 325 MG tablet   Commonly known as:  TYLENOL   Take 2 tablets (650 mg) by mouth every 6 hours as needed for other (surgical pain)   Last time this was given:  650 mg on 11/22/2017  1:27 PM                                   aspirin 325 MG tablet   Take 1 tablet (325 mg) by mouth daily   Start taking on:  11/23/2017   Last time this was given:  325 mg on 11/22/2017  8:17 AM                                   ATORVASTATIN CALCIUM PO   Take 80 mg by mouth daily   Last time this was given:  80 mg on 11/21/2017  7:24 PM                                   calcium carbonate 500 MG chewable tablet   Commonly known as:  TUMS   Take 1 tablet (500 mg) by mouth 4-6 times daily as needed for reflux                                   FINASTERIDE PO   Take 1 mg by mouth daily   Last time this was given:  5 mg on 11/22/2017  8:17 AM                                   lisinopril 2.5 MG tablet   Commonly known as:  PRINIVIL/Zestril   Take 1 tablet (2.5 mg) by mouth daily   Last time this was given:  2.5 mg on 11/22/2017  1:27 PM                                   magnesium gluconate 500 MG tablet   Commonly known as:  MAGONATE   Take 1 tablet (500 mg) by mouth daily for 14 days   Start taking on:  11/23/2017   Last time this was given:  500 mg on 11/22/2017  8:17 AM                                   metoprolol 50 MG tablet   Commonly known as:  LOPRESSOR   Take 1 tablet (50 mg) by mouth every 12 hours   Last time this was given:  37.5 mg on 11/22/2017  8:17 AM                                      OMEPRAZOLE PO   Take 40 mg by mouth every morning Take 30 minutes before breakfast   Last time this was given:   40 mg on 11/22/2017  8:17 AM                                oxyCODONE IR 5 MG tablet   Commonly known as:  ROXICODONE   Take 1-2 tablets (5-10 mg) by mouth every 4 hours as needed for moderate to severe pain   Last time this was given:  10 mg on 11/21/2017  7:06 PM                                   PHENYTOIN SODIUM EXTENDED PO   Take 400 mg by mouth daily   Last time this was given:  400 mg on 11/21/2017  9:54 PM                                   senna-docusate 8.6-50 MG per tablet   Commonly known as:  SENOKOT-S;PERICOLACE   Take 2 tablets by mouth 2 times daily as needed for constipation   Last time this was given:  2 tablets on 11/22/2017  8:17 AM                                   TAMSULOSIN HCL PO   Take 0.4 mg by mouth At Bedtime For urination   Last time this was given:  0.4 mg on 11/21/2017  9:54 PM

## 2017-11-16 ENCOUNTER — APPOINTMENT (OUTPATIENT)
Dept: ULTRASOUND IMAGING | Facility: CLINIC | Age: 77
DRG: 236 | End: 2017-11-16
Attending: PHYSICIAN ASSISTANT
Payer: MEDICARE

## 2017-11-16 ENCOUNTER — ANESTHESIA EVENT (OUTPATIENT)
Dept: SURGERY | Facility: CLINIC | Age: 77
DRG: 236 | End: 2017-11-16
Payer: MEDICARE

## 2017-11-16 ENCOUNTER — APPOINTMENT (OUTPATIENT)
Dept: GENERAL RADIOLOGY | Facility: CLINIC | Age: 77
DRG: 236 | End: 2017-11-16
Attending: SURGERY
Payer: MEDICARE

## 2017-11-16 ENCOUNTER — APPOINTMENT (OUTPATIENT)
Dept: CARDIOLOGY | Facility: CLINIC | Age: 77
DRG: 236 | End: 2017-11-16
Attending: PHYSICIAN ASSISTANT
Payer: MEDICARE

## 2017-11-16 LAB
ABO + RH BLD: NORMAL
ABO + RH BLD: NORMAL
ALBUMIN SERPL-MCNC: 3.2 G/DL (ref 3.4–5)
ALBUMIN UR-MCNC: NEGATIVE MG/DL
ALP SERPL-CCNC: 108 U/L (ref 40–150)
ALT SERPL W P-5'-P-CCNC: 37 U/L (ref 0–70)
ANION GAP SERPL CALCULATED.3IONS-SCNC: 5 MMOL/L (ref 3–14)
ANION GAP SERPL CALCULATED.3IONS-SCNC: 7 MMOL/L (ref 3–14)
APPEARANCE UR: CLEAR
APTT PPP: 88 SEC (ref 22–37)
AST SERPL W P-5'-P-CCNC: 18 U/L (ref 0–45)
BILIRUB DIRECT SERPL-MCNC: <0.1 MG/DL (ref 0–0.2)
BILIRUB SERPL-MCNC: 0.2 MG/DL (ref 0.2–1.3)
BILIRUB UR QL STRIP: NEGATIVE
BLD GP AB SCN SERPL QL: NORMAL
BLD PROD TYP BPU: NORMAL
BLOOD BANK CMNT PATIENT-IMP: NORMAL
BUN SERPL-MCNC: 10 MG/DL (ref 7–30)
BUN SERPL-MCNC: 12 MG/DL (ref 7–30)
CALCIUM SERPL-MCNC: 8.4 MG/DL (ref 8.5–10.1)
CALCIUM SERPL-MCNC: 8.7 MG/DL (ref 8.5–10.1)
CHLORIDE SERPL-SCNC: 108 MMOL/L (ref 94–109)
CHLORIDE SERPL-SCNC: 109 MMOL/L (ref 94–109)
CO2 SERPL-SCNC: 27 MMOL/L (ref 20–32)
CO2 SERPL-SCNC: 28 MMOL/L (ref 20–32)
COLOR UR AUTO: YELLOW
CREAT SERPL-MCNC: 0.69 MG/DL (ref 0.66–1.25)
CREAT SERPL-MCNC: 0.77 MG/DL (ref 0.66–1.25)
ERYTHROCYTE [DISTWIDTH] IN BLOOD BY AUTOMATED COUNT: 13 % (ref 10–15)
ERYTHROCYTE [DISTWIDTH] IN BLOOD BY AUTOMATED COUNT: 13.1 % (ref 10–15)
GFR SERPL CREATININE-BSD FRML MDRD: >90 ML/MIN/1.7M2
GFR SERPL CREATININE-BSD FRML MDRD: >90 ML/MIN/1.7M2
GLUCOSE SERPL-MCNC: 119 MG/DL (ref 70–99)
GLUCOSE SERPL-MCNC: 94 MG/DL (ref 70–99)
GLUCOSE UR STRIP-MCNC: NEGATIVE MG/DL
HBA1C MFR BLD: 5.2 % (ref 4.3–6)
HCT VFR BLD AUTO: 36.8 % (ref 40–53)
HCT VFR BLD AUTO: 37 % (ref 40–53)
HGB BLD-MCNC: 12.4 G/DL (ref 13.3–17.7)
HGB BLD-MCNC: 12.4 G/DL (ref 13.3–17.7)
HGB UR QL STRIP: ABNORMAL
INR PPP: 1.07 (ref 0.86–1.14)
KETONES UR STRIP-MCNC: NEGATIVE MG/DL
LEUKOCYTE ESTERASE UR QL STRIP: ABNORMAL
LMWH PPP CHRO-ACNC: 0.28 IU/ML
MAGNESIUM SERPL-MCNC: 2.1 MG/DL (ref 1.6–2.3)
MAGNESIUM SERPL-MCNC: 2.1 MG/DL (ref 1.6–2.3)
MCH RBC QN AUTO: 30.7 PG (ref 26.5–33)
MCH RBC QN AUTO: 30.9 PG (ref 26.5–33)
MCHC RBC AUTO-ENTMCNC: 33.5 G/DL (ref 31.5–36.5)
MCHC RBC AUTO-ENTMCNC: 33.7 G/DL (ref 31.5–36.5)
MCV RBC AUTO: 91 FL (ref 78–100)
MCV RBC AUTO: 92 FL (ref 78–100)
MUCOUS THREADS #/AREA URNS LPF: PRESENT /LPF
NITRATE UR QL: NEGATIVE
NUM BPU REQUESTED: 4
PH UR STRIP: 5.5 PH (ref 5–7)
PHOSPHATE SERPL-MCNC: 2.4 MG/DL (ref 2.5–4.5)
PHOSPHATE SERPL-MCNC: 2.6 MG/DL (ref 2.5–4.5)
PLATELET # BLD AUTO: 130 10E9/L (ref 150–450)
PLATELET # BLD AUTO: 137 10E9/L (ref 150–450)
POTASSIUM SERPL-SCNC: 3.5 MMOL/L (ref 3.4–5.3)
POTASSIUM SERPL-SCNC: 3.6 MMOL/L (ref 3.4–5.3)
PROT SERPL-MCNC: 6.8 G/DL (ref 6.8–8.8)
RADIOLOGIST FLAGS: NORMAL
RBC # BLD AUTO: 4.01 10E12/L (ref 4.4–5.9)
RBC # BLD AUTO: 4.04 10E12/L (ref 4.4–5.9)
RBC #/AREA URNS AUTO: 18 /HPF (ref 0–2)
SODIUM SERPL-SCNC: 142 MMOL/L (ref 133–144)
SODIUM SERPL-SCNC: 143 MMOL/L (ref 133–144)
SOURCE: ABNORMAL
SP GR UR STRIP: 1.01 (ref 1–1.03)
SPECIMEN EXP DATE BLD: NORMAL
UROBILINOGEN UR STRIP-MCNC: NORMAL MG/DL (ref 0–2)
WBC # BLD AUTO: 6.9 10E9/L (ref 4–11)
WBC # BLD AUTO: 8.1 10E9/L (ref 4–11)
WBC #/AREA URNS AUTO: 9 /HPF (ref 0–2)

## 2017-11-16 PROCEDURE — 85027 COMPLETE CBC AUTOMATED: CPT | Performed by: NEUROLOGICAL SURGERY

## 2017-11-16 PROCEDURE — 86923 COMPATIBILITY TEST ELECTRIC: CPT | Performed by: THORACIC SURGERY (CARDIOTHORACIC VASCULAR SURGERY)

## 2017-11-16 PROCEDURE — 25000128 H RX IP 250 OP 636: Performed by: STUDENT IN AN ORGANIZED HEALTH CARE EDUCATION/TRAINING PROGRAM

## 2017-11-16 PROCEDURE — 93970 EXTREMITY STUDY: CPT

## 2017-11-16 PROCEDURE — 25000132 ZZH RX MED GY IP 250 OP 250 PS 637: Mod: GY | Performed by: STUDENT IN AN ORGANIZED HEALTH CARE EDUCATION/TRAINING PROGRAM

## 2017-11-16 PROCEDURE — 40000275 ZZH STATISTIC RCP TIME EA 10 MIN

## 2017-11-16 PROCEDURE — 25000132 ZZH RX MED GY IP 250 OP 250 PS 637: Mod: GY | Performed by: INTERNAL MEDICINE

## 2017-11-16 PROCEDURE — 86850 RBC ANTIBODY SCREEN: CPT | Performed by: INTERNAL MEDICINE

## 2017-11-16 PROCEDURE — 80076 HEPATIC FUNCTION PANEL: CPT | Performed by: NEUROLOGICAL SURGERY

## 2017-11-16 PROCEDURE — 85610 PROTHROMBIN TIME: CPT | Performed by: NEUROLOGICAL SURGERY

## 2017-11-16 PROCEDURE — 85730 THROMBOPLASTIN TIME PARTIAL: CPT | Performed by: NEUROLOGICAL SURGERY

## 2017-11-16 PROCEDURE — 81001 URINALYSIS AUTO W/SCOPE: CPT | Performed by: NEUROLOGICAL SURGERY

## 2017-11-16 PROCEDURE — 20000004 ZZH R&B ICU UMMC

## 2017-11-16 PROCEDURE — A9270 NON-COVERED ITEM OR SERVICE: HCPCS | Mod: GY | Performed by: STUDENT IN AN ORGANIZED HEALTH CARE EDUCATION/TRAINING PROGRAM

## 2017-11-16 PROCEDURE — 25500064 ZZH RX 255 OP 636: Performed by: INTERNAL MEDICINE

## 2017-11-16 PROCEDURE — 83735 ASSAY OF MAGNESIUM: CPT | Performed by: STUDENT IN AN ORGANIZED HEALTH CARE EDUCATION/TRAINING PROGRAM

## 2017-11-16 PROCEDURE — 40000264 ECHO COMPLETE WITH OPTISON

## 2017-11-16 PROCEDURE — 85027 COMPLETE CBC AUTOMATED: CPT | Performed by: STUDENT IN AN ORGANIZED HEALTH CARE EDUCATION/TRAINING PROGRAM

## 2017-11-16 PROCEDURE — 86901 BLOOD TYPING SEROLOGIC RH(D): CPT | Performed by: THORACIC SURGERY (CARDIOTHORACIC VASCULAR SURGERY)

## 2017-11-16 PROCEDURE — 86900 BLOOD TYPING SEROLOGIC ABO: CPT | Performed by: THORACIC SURGERY (CARDIOTHORACIC VASCULAR SURGERY)

## 2017-11-16 PROCEDURE — 80048 BASIC METABOLIC PNL TOTAL CA: CPT | Performed by: STUDENT IN AN ORGANIZED HEALTH CARE EDUCATION/TRAINING PROGRAM

## 2017-11-16 PROCEDURE — 83036 HEMOGLOBIN GLYCOSYLATED A1C: CPT | Performed by: NEUROLOGICAL SURGERY

## 2017-11-16 PROCEDURE — 86900 BLOOD TYPING SEROLOGIC ABO: CPT | Performed by: INTERNAL MEDICINE

## 2017-11-16 PROCEDURE — A9270 NON-COVERED ITEM OR SERVICE: HCPCS | Mod: GY | Performed by: INTERNAL MEDICINE

## 2017-11-16 PROCEDURE — 84100 ASSAY OF PHOSPHORUS: CPT | Performed by: STUDENT IN AN ORGANIZED HEALTH CARE EDUCATION/TRAINING PROGRAM

## 2017-11-16 PROCEDURE — 99232 SBSQ HOSP IP/OBS MODERATE 35: CPT | Performed by: INTERNAL MEDICINE

## 2017-11-16 PROCEDURE — 93880 EXTRACRANIAL BILAT STUDY: CPT

## 2017-11-16 PROCEDURE — 93306 TTE W/DOPPLER COMPLETE: CPT | Mod: 26 | Performed by: INTERNAL MEDICINE

## 2017-11-16 PROCEDURE — 80048 BASIC METABOLIC PNL TOTAL CA: CPT | Performed by: NEUROLOGICAL SURGERY

## 2017-11-16 PROCEDURE — 86901 BLOOD TYPING SEROLOGIC RH(D): CPT | Performed by: INTERNAL MEDICINE

## 2017-11-16 PROCEDURE — 86850 RBC ANTIBODY SCREEN: CPT | Performed by: THORACIC SURGERY (CARDIOTHORACIC VASCULAR SURGERY)

## 2017-11-16 PROCEDURE — 85520 HEPARIN ASSAY: CPT | Performed by: STUDENT IN AN ORGANIZED HEALTH CARE EDUCATION/TRAINING PROGRAM

## 2017-11-16 PROCEDURE — 84134 ASSAY OF PREALBUMIN: CPT | Performed by: NEUROLOGICAL SURGERY

## 2017-11-16 PROCEDURE — 86900 BLOOD TYPING SEROLOGIC ABO: CPT | Performed by: NEUROLOGICAL SURGERY

## 2017-11-16 PROCEDURE — 71010 XR CHEST PORT 1 VW: CPT

## 2017-11-16 RX ORDER — CEFAZOLIN SODIUM 2 G/100ML
2 INJECTION, SOLUTION INTRAVENOUS
Status: DISCONTINUED | OUTPATIENT
Start: 2017-11-17 | End: 2017-11-17 | Stop reason: HOSPADM

## 2017-11-16 RX ORDER — ACETAMINOPHEN 325 MG/1
975 TABLET ORAL ONCE
Status: COMPLETED | OUTPATIENT
Start: 2017-11-17 | End: 2017-11-17

## 2017-11-16 RX ORDER — MUPIROCIN 20 MG/G
1 OINTMENT TOPICAL 2 TIMES DAILY
Status: DISCONTINUED | OUTPATIENT
Start: 2017-11-16 | End: 2017-11-17 | Stop reason: HOSPADM

## 2017-11-16 RX ADMIN — METOPROLOL TARTRATE 12.5 MG: 25 TABLET, FILM COATED ORAL at 07:42

## 2017-11-16 RX ADMIN — POTASSIUM CHLORIDE 20 MEQ: 750 TABLET, EXTENDED RELEASE ORAL at 05:10

## 2017-11-16 RX ADMIN — ATORVASTATIN CALCIUM 80 MG: 80 TABLET, FILM COATED ORAL at 07:41

## 2017-11-16 RX ADMIN — HUMAN ALBUMIN MICROSPHERES AND PERFLUTREN 6 ML: 10; .22 INJECTION, SOLUTION INTRAVENOUS at 12:00

## 2017-11-16 RX ADMIN — TAMSULOSIN HYDROCHLORIDE 0.4 MG: 0.4 CAPSULE ORAL at 22:04

## 2017-11-16 RX ADMIN — ACETAMINOPHEN 650 MG: 325 TABLET, FILM COATED ORAL at 02:00

## 2017-11-16 RX ADMIN — FINASTERIDE 5 MG: 5 TABLET, FILM COATED ORAL at 07:43

## 2017-11-16 RX ADMIN — OMEPRAZOLE 40 MG: 20 CAPSULE, DELAYED RELEASE ORAL at 07:41

## 2017-11-16 RX ADMIN — SENNOSIDES AND DOCUSATE SODIUM 1 TABLET: 8.6; 5 TABLET ORAL at 19:32

## 2017-11-16 RX ADMIN — ASPIRIN 81 MG: 81 TABLET, COATED ORAL at 07:41

## 2017-11-16 RX ADMIN — NITROGLYCERIN 0.2 MCG/KG/MIN: 20 INJECTION INTRAVENOUS at 23:03

## 2017-11-16 RX ADMIN — PHENYTOIN SODIUM 400 MG: 100 CAPSULE ORAL at 22:04

## 2017-11-16 RX ADMIN — HEPARIN SODIUM 800 UNITS/HR: 10000 INJECTION, SOLUTION INTRAVENOUS at 07:08

## 2017-11-16 RX ADMIN — POTASSIUM CHLORIDE 20 MEQ: 750 TABLET, EXTENDED RELEASE ORAL at 22:04

## 2017-11-16 ASSESSMENT — ENCOUNTER SYMPTOMS: SEIZURES: 1

## 2017-11-16 ASSESSMENT — PAIN DESCRIPTION - DESCRIPTORS: DESCRIPTORS: SORE

## 2017-11-16 NOTE — PLAN OF CARE
Problem: Patient Care Overview  Goal: Plan of Care/Patient Progress Review  Outcome: Improving  Afebrile, HR SR/SB 49-70s SR with PACs. IABP in R groin 1:1, 100% augmentation.  BPs stable.  On Nitro drip at 0.1 mcg/kg/hr.  Denies chest pain. Reporting sore back.  LS clear on room air.  A&O x4.  No BM during shift.  UOP  ml/hr via [place]. 20 mEq potassium given. Plan for echo today and CABG Friday morning. Will continue current plan of care and update MDs with any changes.

## 2017-11-16 NOTE — PLAN OF CARE
Problem: Patient Care Overview  Goal: Plan of Care/Patient Progress Review  Pt remained stable t/o shift. Complained once of chest tightness which resolved. Pt will be going for surgery tomorrow at 1000. Continue to monitor.    Jay Pina  4:58 PM  11/16/2017

## 2017-11-16 NOTE — PROGRESS NOTES
[facility] Cardiology Progress Note           Assessment and Plan:     Luis Eduardo Oliver is a 77 yr old man pmh of seizure disorder (no sz >20 yrs), BPH, and Rob's esophagus who was admitted to [facility] for abnormal stress echo, now transferred for consideration for CABG in setting of severe LM disease.      # Unstable angina  Severe left main disease. Hemodynamically stable. IABP placed prior to transfer. Loaded with ticagrelor on 11/14. Trops remained low and trended down at OSH, will recheck if chest pain recurs. Risk factors eval-ed at OSH (A1c 5.5%, Lipids wnl, TSH wnl)  -- Continue ASA 81, atorvastatin 80, metop tart 12.5,   -- IABP  -- CVTS consult for CABG - undergoing CAB w/o.  -- Heparin gtt  -- Hold ticagrelor  -- Nitro gtt titrate to chest pain     # Hx seizure disorder  No sz >20 yrs.   -- Phenytoin ER 400mg qhs  -- Phenytoin level in am     # BPH  -- PTA finasteride, tamsulosin     # Rob's esophagus  -- PTA omeprazole     FEN:   - No mIVF  - Replete lytes per protocol  - 2g Na / 2L fluid restriction // NPO at MN for possible procedure    Sade Barker MD   Cardiology Fellow  [phone]    Patient was seen by and the above plan discussed with Dr. Rodgers.     Subjective:     Patient denies current chest pain, dyspnea on exertion, orthopnea, PND, lightheadedness, or palpitations. Had some chest pain earlier, resolved with more nitro (now 0.2).           Review of Systems:   A comprehensive review of systems was performed and found to be negative except as described in this note          Medications:     Current Facility-Administered Medications   Medication     [COMPLETED] perflutren diluted 1mL to 2mL with saline (OPTISON) diluted injection 6 mL     naloxone (NARCAN) injection 0.1-0.4 mg     lidocaine 1 % 1 mL     lidocaine (LMX4) kit     sodium chloride (PF) 0.9% PF flush 3 mL     sodium chloride (PF) 0.9% PF flush 3 mL     Patient is already receiving anticoagulation with heparin,  enoxaparin (LOVENOX), warfarin (COUMADIN)  or other anticoagulant medication     acetaminophen (TYLENOL) tablet 650 mg     senna-docusate (SENOKOT-S;PERICOLACE) 8.6-50 MG per tablet 1 tablet    Or     senna-docusate (SENOKOT-S;PERICOLACE) 8.6-50 MG per tablet 2 tablet     polyethylene glycol (MIRALAX/GLYCOLAX) Packet 17 g     prochlorperazine (COMPAZINE) injection 5 mg    Or     prochlorperazine (COMPAZINE) tablet 5 mg    Or     prochlorperazine (COMPAZINE) Suppository 12.5 mg     heparin  drip 25,000 units in 0.45% NaCl 250 mL (see additional administration details for dose)     heparin bolus from infusion pump     nitroGLYcerin 50 mg in D5W 250 mL (adult std) infusion     atorvastatin (LIPITOR) tablet 80 mg     finasteride (PROSCAR) tablet 5 mg     metoprolol (LOPRESSOR) half-tab 12.5 mg     omeprazole (priLOSEC) CR capsule 40 mg     phenytoin (DILANTIN) CR capsule 400 mg     aspirin EC EC tablet 81 mg     tamsulosin (FLOMAX) capsule 0.4 mg     lidocaine (LMX4) kit     heparin lock flush 10 UNIT/ML injection 2-5 mL     sodium chloride (PF) 0.9% PF flush 10-20 mL     heparin lock flush 10 UNIT/ML injection 5-10 mL     heparin lock flush 10 UNIT/ML injection 5-10 mL     calcium carbonate (TUMS) chewable tablet 500 mg     potassium chloride SA (K-DUR/KLOR-CON M) CR tablet 20-40 mEq     potassium chloride (KLOR-CON) Packet 20-40 mEq     potassium chloride 10 mEq in 100 mL sterile water intermittent infusion (premix)     potassium chloride 10 mEq in 100 mL intermittent infusion with 10 mg lidocaine     potassium chloride 20 mEq in 50 mL intermittent infusion     magnesium sulfate 2 g in NS intermittent infusion (PharMEDium or FV Cmpd)     magnesium sulfate 4 g in 100 mL sterile water (premade)               Objective:   Vital signs:  Temp: 98.5  F (36.9  C) Temp src: Oral BP: 125/60   Heart Rate: 64 Resp: 16 SpO2: 97 % O2 Device: None (Room air) Oxygen Delivery: 1 LPM   Weight: 80.2 kg (176 lb 12.9 oz)  There is no  height or weight on file to calculate BMI.    General: Pleasant elderly man, lying in bed in NAD  HEENT: NC/AT, PERRL, EOMI, MMM  Neck: Supple, no JVD  Chest/Resp: CTAB, no wheezes/rhonchi/crackles  Heart/CV: RRR, no m/r/g, sound of balloon pump heard well  Abdomen/GI: NABS, S/ND/NT  Extremities/MSK: No edema, 2+ peripheral pulses  Skin: No concerning rashes/lesions  Neuro: AOx3, CN's II-XII grossly intact  Psych: Appropriate mood/affect            Data:     Results for orders placed or performed during the hospital encounter of 11/15/17 (from the past 24 hour(s))   Heparin Xa level (AM Draw)   Result Value Ref Range    Heparin 10A Level 0.24 IU/mL   CBC with platelets differential   Result Value Ref Range    WBC 7.7 4.0 - 11.0 10e9/L    RBC Count 3.95 (L) 4.4 - 5.9 10e12/L    Hemoglobin 12.0 (L) 13.3 - 17.7 g/dL    Hematocrit 36.2 (L) 40.0 - 53.0 %    MCV 92 78 - 100 fl    MCH 30.4 26.5 - 33.0 pg    MCHC 33.1 31.5 - 36.5 g/dL    RDW 13.0 10.0 - 15.0 %    Platelet Count 158 150 - 450 10e9/L    Diff Method Automated Method     % Neutrophils 65.5 %    % Lymphocytes 23.0 %    % Monocytes 9.3 %    % Eosinophils 1.8 %    % Basophils 0.1 %    % Immature Granulocytes 0.3 %    Nucleated RBCs 0 0 /100    Absolute Neutrophil 5.1 1.6 - 8.3 10e9/L    Absolute Lymphocytes 1.8 0.8 - 5.3 10e9/L    Absolute Monocytes 0.7 0.0 - 1.3 10e9/L    Absolute Eosinophils 0.1 0.0 - 0.7 10e9/L    Absolute Basophils 0.0 0.0 - 0.2 10e9/L    Abs Immature Granulocytes 0.0 0 - 0.4 10e9/L    Absolute Nucleated RBC 0.0    Basic metabolic panel (PCU Collect TBD)   Result Value Ref Range    Sodium 141 133 - 144 mmol/L    Potassium 3.5 3.4 - 5.3 mmol/L    Chloride 107 94 - 109 mmol/L    Carbon Dioxide 29 20 - 32 mmol/L    Anion Gap 5 3 - 14 mmol/L    Glucose 127 (H) 70 - 99 mg/dL    Urea Nitrogen 12 7 - 30 mg/dL    Creatinine 0.69 0.66 - 1.25 mg/dL    GFR Estimate >90 >60 mL/min/1.7m2    GFR Estimate If Black >90 >60 mL/min/1.7m2    Calcium 8.2 (L) 8.5  - 10.1 mg/dL   Magnesium   Result Value Ref Range    Magnesium 2.1 1.6 - 2.3 mg/dL   Phosphorus   Result Value Ref Range    Phosphorus 2.4 (L) 2.5 - 4.5 mg/dL   CT Chest w/o Contrast   Result Value Ref Range    Radiologist flags Thyroid nodule     Narrative    EXAMINATION: CT CHEST W/O CONTRAST, 11/15/2017 5:04 PM    TECHNIQUE:  Helical CT images from the thoracic inlet through the lung  bases were obtained without IV contrast. Contrast dose: None    COMPARISON: Chest x-ray same day    HISTORY: pre-cabg;     FINDINGS:    The central tracheobronchial tree is patent. Dependent and scarring  atelectasis bilaterally. Minimal interlobular septal thickening along  with moderate traction bronchiectasis bilaterally. No pneumothorax.  Few scattered subcentimeter solid pulmonary nodules, for example a 4  mm left upper lobe solid nodule (series 6 image 96) and a 3 mm right  upper lobe nodule (image 113).     The heart size is normal. Severe three-vessel calcific coronary artery  atherosclerosis. No pericardial effusion. Intra-aortic balloon pump  with superior tip at the level of the shirley. The abdominal aorta is  normal in caliber. 2.4 x 1.7 cm hypodense nodule in the left lobe of  the thyroid. Right upper extremity approach PICC with tip projecting  over the innominate venous confluence.    Postoperative changes from cholecystectomy. Fatty atrophy of the  pancreas. The upper abdomen is otherwise unremarkable. No worrisome  bony or soft tissue lesions.      Impression    IMPRESSION:   1. Bibasilar atelectasis and scarring along with subpleural lines and  traction bronchiectasis without acute abnormality about the lungs.  2. 2.4 cm hypodense nodule in the left lobe of thyroid, consider  dedicated thyroid ultrasound for further evaluation.  3. Prominent three-vessel coronary artery atherosclerotic  calcification.  4. Right upper approach PICC with tip in the venous confluence,  consider advancement of 3-5 cm.  5. Scattered  subcentimeter pulmonary nodules measuring up to 4 mm, if  the patient is at high risk for lung cancer an optional chest CT may  be performed in one year to evaluate for stability. Otherwise no  follow-up is warranted.    [Consider Follow Up: Thyroid nodule]    This report will be copied to the [facility] to ensure a  provider acknowledges the finding.     I have personally reviewed the examination and initial interpretation  and I agree with the findings.    JENIFER MARTINEZ MD   XR Chest Port 1 View    Narrative    Exam: XR CHEST PORT 1 VW, 11/16/2017 1:40 AM    Indication: balloon pump placement;     Comparison: Radiograph and CT 11/15/2017.    Findings:   Single portable AP view of the chest. IABP approximately 2 cm above  the shirley. Right-sided PICC with tip at the innominate confluence.  Cardiac silhouette is stable periportal vasculature is distinct.  Minimal streaky bibasilar opacities. No pleural effusion or  pneumothorax. Normal lung volumes.      Impression    Impression:   1. IABP 2 cm above the shirley.   2. Right-sided PICC with tip at the innominate confluence.   3. Streaky bibasilar opacities, atelectasis or consolidation.    I have personally reviewed the examination and initial interpretation  and I agree with the findings.    MAHENDRA HOUSTON MD   Heparin Xa (10a) Level   Result Value Ref Range    Heparin 10A Level 0.28 IU/mL   CBC (AM Draw)   Result Value Ref Range    WBC 6.9 4.0 - 11.0 10e9/L    RBC Count 4.04 (L) 4.4 - 5.9 10e12/L    Hemoglobin 12.4 (L) 13.3 - 17.7 g/dL    Hematocrit 36.8 (L) 40.0 - 53.0 %    MCV 91 78 - 100 fl    MCH 30.7 26.5 - 33.0 pg    MCHC 33.7 31.5 - 36.5 g/dL    RDW 13.0 10.0 - 15.0 %    Platelet Count 137 (L) 150 - 450 10e9/L   Basic metabolic panel   Result Value Ref Range    Sodium 143 133 - 144 mmol/L    Potassium 3.6 3.4 - 5.3 mmol/L    Chloride 109 94 - 109 mmol/L    Carbon Dioxide 27 20 - 32 mmol/L    Anion Gap 7 3 - 14 mmol/L    Glucose 94 70 - 99  mg/dL    Urea Nitrogen 12 7 - 30 mg/dL    Creatinine 0.77 0.66 - 1.25 mg/dL    GFR Estimate >90 >60 mL/min/1.7m2    GFR Estimate If Black >90 >60 mL/min/1.7m2    Calcium 8.7 8.5 - 10.1 mg/dL   Magnesium   Result Value Ref Range    Magnesium 2.1 1.6 - 2.3 mg/dL   Phosphorus   Result Value Ref Range    Phosphorus 2.6 2.5 - 4.5 mg/dL   Type and Screen (AM Draw)   Result Value Ref Range    ABO O     RH(D) Pos     Antibody Screen Neg     Test Valid Only At          [facility]    Specimen Expires 11/19/2017         All cardiac studies reviewed  All imaging studies reviewed by me.                ATTENDING NOTE:  Patient has been seen and evaluated by me on 11/16/2017. \ I have reviewed today's vital signs, medications, labs, and imaging results.  I have reviewed and edited, as necessary, the history, review of systems, physical examination, and assessment and plan.  I have discussed my assessment and plan with the cardiology fellow.  Luis Eduardo Oliver is a 77 year old male with risk factor profile (+) HTN, (-) DM, (-) hypercholesterolemia, (-) tobacco use, (+) fam Hx premature CAD, was transferred [facility] 12 nights ago for elective CABG.  PMHx remarkable for Rob's esophagus.  He describes an 8 month history of exertional chest discomfort and dyspnea.  He had stress ECHO (11/13/17) showing abnormal baseline images. There was severe hypokinesis of the apical septal, apical lateral, and apical wall(s).  The LVEF was 50%. At peak stress, there was severe hypokinesis of the mid-apical anterior, mid inferolateral, mid anterolateral, apical septal, and apical lateral wall(s). The LVEF dropped to 35%.  Coronary angiography (11/14/17) showed a right dominant system, 95% stenosis of distal left main extending into LCx w/ MENDOZA 3 flow, 95% stenosis of ostium of LAD, mild-moderate calcification of pLAD, moderate disease of pLAD, and moderate diffuse disease of m/dLAD.   The patient  received Ticagrelor at the outlying institution.  He was referred for CABG but an IABP was placed because CT Surgery felt the risk of bleeding associated with immediate CABG outweighed the potential benefit of earlier revascularization.  He is on IV Heparin, BB, ACE-I (started), and statin.  He had single episode of chest pain yesterday and again this morning, both resolving with increase in NTG (now at 0.2 mcg/kg/min).  Hemodynamics stable.  IABP functioning well.  CT chest completed yesterday, identifying thyroid nodule (follow up as outpatient).  Vein mapping today.  NICS today.       Brandon Rodgers MD     Cardiovascular Division

## 2017-11-16 NOTE — PROGRESS NOTES
See Progress Note with same date.      ATTENDING NOTE:  Patient has been seen and evaluated by me on 11/16/2017. \ I have reviewed today's vital signs, medications, labs, and imaging results.  I have reviewed and edited, as necessary, the history, review of systems, physical examination, and assessment and plan.  I have discussed my assessment and plan with the cardiology fellow.  Luis Eduardo Oliver is a 77 year old male with risk factor profile (+) HTN, (-) DM, (-) hypercholesterolemia, (-) tobacco use, (+) fam Hx premature CAD, was transferred to [facility] 12 nights ago for elective CABG.  PMHx remarkable for Rob's esophagus.  He describes an 8 month history of exertional chest discomfort and dyspnea.  He had stress ECHO (11/13/17) showing abnormal baseline images. There was severe hypokinesis of the apical septal, apical lateral, and apical wall(s).  The LVEF was 50%. At peak stress, there was severe hypokinesis of the mid-apical anterior, mid inferolateral, mid anterolateral, apical septal, and apical lateral wall(s). The LVEF dropped to 35%.  Coronary angiography (11/14/17) showed a right dominant system, 95% stenosis of distal left main extending into LCx w/ MENDOZA 3 flow, 95% stenosis of ostium of LAD, mild-moderate calcification of pLAD, moderate disease of pLAD, and moderate diffuse disease of m/dLAD.   The patient received Ticagrelor at the outlying institution.  He was referred for CABG but an IABP was placed because CT Surgery felt the risk of bleeding associated with immediate CABG outweighed the potential benefit of earlier revascularization.  He is on IV Heparin, BB, ACE-I (started), and statin.  He had single episode of chest pain yesterday and again this morning, both resolving with increase in NTG (now at 0.2 mcg/kg/min).  Hemodynamics stable.  IABP functioning well.  CT chest completed yesterday, identifying thyroid nodule (follow up as outpatient).  Vein mapping today.  NICS today.       Brandon Rodgers,  MD     Cardiovascular Division

## 2017-11-16 NOTE — ANESTHESIA PREPROCEDURE EVALUATION
Anesthesia Evaluation     . Pt has had prior anesthetic. Type: General    No history of anesthetic complications          ROS/MED HX    ENT/Pulmonary:  - neg pulmonary ROS     Neurologic:     (+)seizures (>25 yrs ago. Takes nightly phenytoin. )     Cardiovascular:     (+) --CAD, --. Taking blood thinners Pt has received instructions: . . . :. . Previous cardiac testing Echodate:11/16/17results:Interpretation Summary  Technically difficult study with poor acoustic windows.  Global and regional left ventricular function is normal with an EF of 60-65%.  Right ventricular function, chamber size, wall motion, and thickness are  normal.  Pulmonary artery systolic pressure cannot be assessed.  The inferior vena cava was normal in size with preserved respiratory  variability. Estimated mean right atrial pressure is 3 mmHg.  No pericardial effusion is present.  Previous study not available for comparison.  _____________________________________________________________________________  __        Left Ventricle  Global and regional left ventricular function is normal with an EF of 60-65%.  Left ventricular wall thickness is normal. Left ventricular size is normal.  Left ventricular diastolic function is indeterminate. No regional wall motion  abnormalities are seen.     Right Ventricle  Right ventricular function, chamber size, wall motion, and thickness are  normal.     Atria  Both atria appear normal.        Mitral Valve  The mitral valve is normal.     Aortic Valve  Trace aortic insufficiency is present.     Tricuspid Valve  Trace tricuspid insufficiency is present. The peak velocity of the tricuspid  regurgitant jet is not obtainable. Pulmonary artery systolic pressure cannot  be assessed.     Pulmonic Valve  The pulmonic valve cannot be assessed.     Vessels  The inferior vena cava was normal in size with preserved respiratory  variability. The aorta root is normal. Ascending aorta 3.5 cm. Sinuses of  Valsalva 3.8 cm.  Estimated mean right atrial pressure is 3 mmHg.     Pericardium  No pericardial effusion is present.        Miscellaneous  Technically difficult study with poor acoustic windows.Stress Testdate:11/13/17 results:  Rest ECG  Normal sinus rhythm.  Procedure  A bicycle ergometry ( 100 [place]) exercise test was performed. Stress and rest  echocardiographic evaluation was performed from multiple acoustic windows for  evaluation of ventricular function with 2D imaging, limited spectral doppler,   and color Doppler. The study was performed in the Stress lab.    Conclusions  Impressions  Markedly abnormal study. Findings suggest multiple vessel coronary artery  disease.  Summary  Stress results: Duration of exercise was 7 min and 10 sec. Maximal work rate was  5.1 METs. Target heart rate is 120 bpm. Maximal heart rate during stress was  101 bpm ( 71 % of maximal predicted heart rate). The rate-pressure product for  the peak heart rate and blood pressure was 87195. The patient experienced 5 out  of 10 chest tightness during stress. The stress test was terminated due to  moderate chest discomfort and electrocardiographic changes.  ECG conclusions: The stress ECG was consistent with myocardial ischemia.  Baseline: There was severe hypokinesis of the apical septal, apical lateral, and  apical wall(s).  Peak stress: There was severe hypokinesis of the mid-apical anterior, mid  inferolateral, mid anterolateral, apical septal, and apical lateral wall(s).  The left ventricle was mildly enlarged. There was a moderate reduction in LV  function. Estimated left ventricular ejection fraction was 35 %.    Stress data  No medications or fluids given.  Stress summary  Duration of exercise was 7 min and 10 sec. The patient exercised on protocol  bike 6 to stage 3. Maximal work rate was 5.1 METs. Target heart rate is 120  bpm. Maximal heart rate during stress was 101 bpm ( 71 % of maximal predicted  heart rate). The heart rate response to  stress was normal. There was normal  resting blood pressure with an appropriate response to stress. The  rate-pressure product for the peak heart rate and blood pressure was 84050. The  patient experienced 5 out of 10 chest tightness during stress. The stress test  was terminated due to moderate chest discomfort and electrocardiographic  changes. The stress ECG was consistent with myocardial ischemia. Arrhythmia  during stress: isolated atrial premature beats, isolated premature ventricular  beats, and pairs of premature ventricular beats.    Imaging data  Image properties  The image quality was good.  Stress 2D echocardiographic results  Baseline: There was severe hypokinesis of the apical septal, apical lateral, and  apical wall(s). Left ventricular size was normal. Estimated left ventricular  ejection fraction was 50 % . Peak stress: There was severe hypokinesis of the  mid-apical anterior, mid inferolateral, mid anterolateral, apical septal, and  apical lateral wall(s). The left ventricle was mildly enlarged. There was a  moderate reduction in LV function. Estimated left ventricular ejection fraction  was 35 %. There were no significant echo findings found on resting images.ECG reviewed date:11/15/17 results:Sinus bradycardia with Premature atrial complexes  Minimal voltage criteria for LVH, may be normal variant  Borderline ECG  When compared with ECG of 15-NOV-2017 04:23, (unconfirmed)  Premature atrial complexes are now PresentCath date: 11/14/17 results:Right dominant system.   95% stenosis of distal left main extending into Lcx w/ MENDOZA 3 flow  95% stenosis of ostium of LAD  Mild-moderate calcification of pLAD  Moderate disease of pLAD, and moderate diffuse disease of m/dLAD          METS/Exercise Tolerance:     Hematologic:  - neg hematologic  ROS       Musculoskeletal: Comment: OA of the knees.         GI/Hepatic: Comment: PSHx of appy and pedro.     (+) GERD Asymptomatic on medication,        Renal/Genitourinary:  - ROS Renal section negative       Endo:  - neg endo ROS       Psychiatric:  - neg psychiatric ROS       Infectious Disease:  - neg infectious disease ROS       Malignancy:      - no malignancy   Other:                     Physical Exam  Normal systems: cardiovascular and pulmonary    Airway   Mallampati: I  TM distance: >3 FB  Neck ROM: full    Dental   (+) upper dentures and lower dentures    Cardiovascular   Rhythm and rate: regular and normal      Pulmonary    breath sounds clear to auscultation                    Anesthesia Plan      History & Physical Review  History and physical reviewed and following examination; no interval change.    ASA Status:  4 .    NPO Status:  > 8 hours    Plan for General, RSI and ETT with Intravenous and Propofol induction. Maintenance will be Balanced.    PONV prophylaxis:  Ondansetron (or other 5HT-3)  Additional equipment: 2nd IV, Arterial Line, Central Line, PA Catheter, CVP and LAW      Postoperative Care  Postoperative pain management:  IV analgesics.      Consents        Procedure: Procedure(s):  Coronary Artery Bypass Graft  - Wound Class:     HPI: Luis Eduardo Oliver is a 77 year old male who presents for the above procedure.     PMHx/PSHx:  Past Medical History:   Diagnosis Date     BPH (benign prostatic hyperplasia)      Cholelithiasis      Seizure disorder (H)        Past Surgical History:   Procedure Laterality Date     APPENDECTOMY       CHOLECYSTECTOMY       COLONOSCOPY  03/16/2017     UPPER GI ENDOSCOPY  03/16/2017     VASECTOMY           No current facility-administered medications on file prior to encounter.   No current outpatient prescriptions on file prior to encounter.    Social Hx:   Social History   Substance Use Topics     Smoking status: Never Smoker     Smokeless tobacco: Never Used     Alcohol use No       Allergies: No Known Allergies      NPO Status: Per ASA Guidelines    Labs:    Blood Bank:  Lab Results   Component Value Date     ABO O 2017    RH Pos 2017    AS Neg 2017     BMP:  Recent Labs   Lab Test  17   0429   NA  143   POTASSIUM  3.6   CHLORIDE  109   CO2  27   BUN  12   CR  0.77   GLC  94   BEAN  8.7     CBC:   Recent Labs   Lab Test  17   0429   WBC  6.9   RBC  4.04*   HGB  12.4*   HCT  36.8*   MCV  91   MCH  30.7   MCHC  33.7   RDW  13.0   PLT  137*     Coags:  Recent Labs   Lab Test  11/15/17   0303   INR  1.04       Procedure:  Procedure(s):  Median Sternotomy, Coronary Artery Bypass Graft x 2 using Left Internal Mammary and Left Endoscopic Greater Saphenous Vein [place]     on pump oxygenator - Wound Class: I-Clean    Patient Active Problem List   Diagnosis     CAD (coronary artery disease)       Past Medical History:   Diagnosis Date     BPH (benign prostatic hyperplasia)      Cholelithiasis      Seizure disorder (H)        Past Surgical History:   Procedure Laterality Date     APPENDECTOMY       CHOLECYSTECTOMY       COLONOSCOPY  2017     UPPER GI ENDOSCOPY  2017     VASECTOMY           No current facility-administered medications on file prior to encounter.   No current outpatient prescriptions on file prior to encounter.    No Known Allergies    Recent Labs   Lab Test  17   1313   HGB  12.0*     Recent Labs   Lab Test  17   1313   POTASSIUM  4.0     Recent Labs   Lab Test  17   0412   PLT  121*     Recent Labs   Lab Test  17   0412   INR  1.09       Recent Results (from the past 4320 hour(s))   ECHO COMPLETE WITH OPTISON    Narrative    [id number]  [id number]  [id number]  [id number]^NIC^HUMBERTO^[facility]  Echocardiography Laboratory  [address] [place] [id number]     Name: TEMO MCKEON  MRN: [id number]  : 1940  Study Date: 2017 11:29 AM  Age: 77 yrs  Gender: Male  Patient Location: [facility]  Reason For Study: Pre-CABG  Ordering Physician: HUMBERTO LUCERO  Performed By: MAO Cross     BSA:  2.0 m2  Height: 70 in  Weight: 175 lb  BP: 113/50 mmHg  _____________________________________________________________________________  __        Procedure  Complete Portable Echo Adult. Contrast Optison. Technically difficult study.  Optison (NDC #8733-1096-98) given intravenously. Patient was given 6 ml  mixture of 3 ml Optison and 6 ml saline. 3 ml wasted. IV start location R  Hand .  _____________________________________________________________________________  __        Interpretation Summary  Technically difficult study with poor acoustic windows.  Global and regional left ventricular function is normal with an EF of 60-65%.  Right ventricular function, chamber size, wall motion, and thickness are  normal.  Pulmonary artery systolic pressure cannot be assessed.  The inferior vena cava was normal in size with preserved respiratory  variability. Estimated mean right atrial pressure is 3 mmHg.  No pericardial effusion is present.  Previous study not available for comparison.  _____________________________________________________________________________  __        Left Ventricle  Global and regional left ventricular function is normal with an EF of 60-65%.  Left ventricular wall thickness is normal. Left ventricular size is normal.  Left ventricular diastolic function is indeterminate. No regional wall motion  abnormalities are seen.     Right Ventricle  Right ventricular function, chamber size, wall motion, and thickness are  normal.     Atria  Both atria appear normal.        Mitral Valve  The mitral valve is normal.     Aortic Valve  Trace aortic insufficiency is present.     Tricuspid Valve  Trace tricuspid insufficiency is present. The peak velocity of the tricuspid  regurgitant jet is not obtainable. Pulmonary artery systolic pressure cannot  be assessed.     Pulmonic Valve  The pulmonic valve cannot be assessed.     Vessels  The inferior vena cava was normal in size with preserved respiratory  variability.  The aorta root is normal. Ascending aorta 3.5 cm. Sinuses of  Valsalva 3.8 cm. Estimated mean right atrial pressure is 3 mmHg.     Pericardium  No pericardial effusion is present.        Miscellaneous  Technically difficult study with poor acoustic windows.     Compared to Previous Study  Previous study not available for comparison.     Attestation  I have personally viewed the imaging and agree with the interpretation and  report as documented by the fellow, Darron Padilla MD, and/or edited by me.  _____________________________________________________________________________  __     MMode/2D Measurements & Calculations  IVSd: 0.92 cm  LVIDd: 5.3 cm  LVIDs: 3.7 cm  LVPWd: 0.92 cm  FS: 29.9 %  EDV(Teich): 136.5 ml  ESV(Teich): 59.3 ml  LV mass(C)d: 181.2 grams  LV mass(C)dI: 91.9 grams/m2  Ao root diam: 3.8 cm  asc Aorta Diam: 3.5 cm  LVOT diam: 2.3 cm  LVOT area: 4.2 cm2  LA Volume (BP): 32.2 ml     LA Volume Index (BP): 16.3 ml/m2  RWT: 0.35        Doppler Measurements & Calculations  MV E max ying: 44.6 cm/sec  MV A max ynig: 60.6 cm/sec  MV E/A: 0.74  MV dec time: 0.29 sec  E/E' av.8  Lateral E/e': 6.3  Medial E/e': 9.3        _____________________________________________________________________________  __        Report approved by: Zo Simon 2017 03:01 PM            Attending Anesthesiologist    Jordan Diaz MD    *[phone]

## 2017-11-17 ENCOUNTER — ANESTHESIA (OUTPATIENT)
Dept: SURGERY | Facility: CLINIC | Age: 77
DRG: 236 | End: 2017-11-17
Payer: MEDICARE

## 2017-11-17 ENCOUNTER — APPOINTMENT (OUTPATIENT)
Dept: GENERAL RADIOLOGY | Facility: CLINIC | Age: 77
DRG: 236 | End: 2017-11-17
Attending: SURGERY
Payer: MEDICARE

## 2017-11-17 ENCOUNTER — APPOINTMENT (OUTPATIENT)
Dept: GENERAL RADIOLOGY | Facility: CLINIC | Age: 77
DRG: 236 | End: 2017-11-17
Attending: THORACIC SURGERY (CARDIOTHORACIC VASCULAR SURGERY)
Payer: MEDICARE

## 2017-11-17 PROBLEM — I25.10 CORONARY ARTERY DISEASE: Status: ACTIVE | Noted: 2017-11-17

## 2017-11-17 LAB
ANION GAP SERPL CALCULATED.3IONS-SCNC: 8 MMOL/L (ref 3–14)
ANION GAP SERPL CALCULATED.3IONS-SCNC: 9 MMOL/L (ref 3–14)
APTT PPP: 27 SEC (ref 22–37)
APTT PPP: 34 SEC (ref 22–37)
BASE DEFICIT BLDA-SCNC: 0.3 MMOL/L
BASE DEFICIT BLDA-SCNC: 0.5 MMOL/L
BASE DEFICIT BLDA-SCNC: 0.9 MMOL/L
BASE DEFICIT BLDA-SCNC: 1.4 MMOL/L
BASE DEFICIT BLDA-SCNC: 2.3 MMOL/L
BASE DEFICIT BLDA-SCNC: 2.6 MMOL/L
BASE DEFICIT BLDV-SCNC: 1.6 MMOL/L
BASE EXCESS BLDV CALC-SCNC: 0.6 MMOL/L
BLD PROD TYP BPU: NORMAL
BLD UNIT ID BPU: 0
BLOOD PRODUCT CODE: NORMAL
BPU ID: NORMAL
BUN SERPL-MCNC: 10 MG/DL (ref 7–30)
BUN SERPL-MCNC: 10 MG/DL (ref 7–30)
CA-I BLD-MCNC: 4.4 MG/DL (ref 4.4–5.2)
CA-I BLD-MCNC: 4.5 MG/DL (ref 4.4–5.2)
CA-I BLD-MCNC: 4.5 MG/DL (ref 4.4–5.2)
CA-I BLD-MCNC: 4.8 MG/DL (ref 4.4–5.2)
CA-I BLD-MCNC: 5.2 MG/DL (ref 4.4–5.2)
CA-I BLD-MCNC: 5.4 MG/DL (ref 4.4–5.2)
CALCIUM SERPL-MCNC: 8.7 MG/DL (ref 8.5–10.1)
CALCIUM SERPL-MCNC: 8.9 MG/DL (ref 8.5–10.1)
CHLORIDE SERPL-SCNC: 108 MMOL/L (ref 94–109)
CHLORIDE SERPL-SCNC: 111 MMOL/L (ref 94–109)
CO2 SERPL-SCNC: 24 MMOL/L (ref 20–32)
CO2 SERPL-SCNC: 26 MMOL/L (ref 20–32)
CREAT SERPL-MCNC: 0.72 MG/DL (ref 0.66–1.25)
CREAT SERPL-MCNC: 0.79 MG/DL (ref 0.66–1.25)
ERYTHROCYTE [DISTWIDTH] IN BLOOD BY AUTOMATED COUNT: 13 % (ref 10–15)
ERYTHROCYTE [DISTWIDTH] IN BLOOD BY AUTOMATED COUNT: 13 % (ref 10–15)
FIBRINOGEN PPP-MCNC: 291 MG/DL (ref 200–420)
GFR SERPL CREATININE-BSD FRML MDRD: >90 ML/MIN/1.7M2
GFR SERPL CREATININE-BSD FRML MDRD: >90 ML/MIN/1.7M2
GLUCOSE BLD-MCNC: 101 MG/DL (ref 70–99)
GLUCOSE BLD-MCNC: 151 MG/DL (ref 70–99)
GLUCOSE BLD-MCNC: 153 MG/DL (ref 70–99)
GLUCOSE BLD-MCNC: 161 MG/DL (ref 70–99)
GLUCOSE BLD-MCNC: 161 MG/DL (ref 70–99)
GLUCOSE BLDC GLUCOMTR-MCNC: 121 MG/DL (ref 70–99)
GLUCOSE BLDC GLUCOMTR-MCNC: 121 MG/DL (ref 70–99)
GLUCOSE BLDC GLUCOMTR-MCNC: 136 MG/DL (ref 70–99)
GLUCOSE BLDC GLUCOMTR-MCNC: 146 MG/DL (ref 70–99)
GLUCOSE SERPL-MCNC: 153 MG/DL (ref 70–99)
GLUCOSE SERPL-MCNC: 99 MG/DL (ref 70–99)
HCO3 BLD-SCNC: 23 MMOL/L (ref 21–28)
HCO3 BLD-SCNC: 24 MMOL/L (ref 21–28)
HCO3 BLD-SCNC: 24 MMOL/L (ref 21–28)
HCO3 BLD-SCNC: 25 MMOL/L (ref 21–28)
HCO3 BLD-SCNC: 26 MMOL/L (ref 21–28)
HCO3 BLD-SCNC: 26 MMOL/L (ref 21–28)
HCO3 BLDV-SCNC: 25 MMOL/L (ref 21–28)
HCO3 BLDV-SCNC: 28 MMOL/L (ref 21–28)
HCT VFR BLD AUTO: 34.9 % (ref 40–53)
HCT VFR BLD AUTO: 36.8 % (ref 40–53)
HGB BLD-MCNC: 11.2 G/DL (ref 13.3–17.7)
HGB BLD-MCNC: 11.3 G/DL (ref 13.3–17.7)
HGB BLD-MCNC: 11.8 G/DL (ref 13.3–17.7)
HGB BLD-MCNC: 12 G/DL (ref 13.3–17.7)
HGB BLD-MCNC: 12.3 G/DL (ref 13.3–17.7)
INR PPP: 1.09 (ref 0.86–1.14)
INR PPP: 1.28 (ref 0.86–1.14)
INR PPP: 1.46 (ref 0.86–1.14)
LACTATE BLD-SCNC: 1 MMOL/L (ref 0.7–2)
LACTATE BLD-SCNC: 1.1 MMOL/L (ref 0.7–2)
LACTATE BLD-SCNC: 1.1 MMOL/L (ref 0.7–2)
LACTATE BLD-SCNC: 1.2 MMOL/L (ref 0.7–2)
LACTATE BLD-SCNC: 1.7 MMOL/L (ref 0.7–2)
LACTATE BLD-SCNC: 2 MMOL/L (ref 0.7–2)
LMWH PPP CHRO-ACNC: 0.48 IU/ML
MAGNESIUM SERPL-MCNC: 2 MG/DL (ref 1.6–2.3)
MAGNESIUM SERPL-MCNC: 3 MG/DL (ref 1.6–2.3)
MCH RBC QN AUTO: 30.8 PG (ref 26.5–33)
MCH RBC QN AUTO: 31 PG (ref 26.5–33)
MCHC RBC AUTO-ENTMCNC: 33.4 G/DL (ref 31.5–36.5)
MCHC RBC AUTO-ENTMCNC: 33.8 G/DL (ref 31.5–36.5)
MCV RBC AUTO: 92 FL (ref 78–100)
MCV RBC AUTO: 92 FL (ref 78–100)
O2/TOTAL GAS SETTING VFR VENT: 100 %
O2/TOTAL GAS SETTING VFR VENT: 100 %
O2/TOTAL GAS SETTING VFR VENT: 40 %
O2/TOTAL GAS SETTING VFR VENT: 50 %
O2/TOTAL GAS SETTING VFR VENT: 50 %
O2/TOTAL GAS SETTING VFR VENT: 70 %
O2/TOTAL GAS SETTING VFR VENT: 80 %
O2/TOTAL GAS SETTING VFR VENT: 80 %
OXYHGB MFR BLD: 96 % (ref 92–100)
OXYHGB MFR BLDV: 75 %
PCO2 BLD: 38 MM HG (ref 35–45)
PCO2 BLD: 42 MM HG (ref 35–45)
PCO2 BLD: 45 MM HG (ref 35–45)
PCO2 BLD: 48 MM HG (ref 35–45)
PCO2 BLD: 49 MM HG (ref 35–45)
PCO2 BLD: 50 MM HG (ref 35–45)
PCO2 BLDV: 52 MM HG (ref 40–50)
PCO2 BLDV: 55 MM HG (ref 40–50)
PH BLD: 7.31 PH (ref 7.35–7.45)
PH BLD: 7.33 PH (ref 7.35–7.45)
PH BLD: 7.39 PH (ref 7.35–7.45)
PH BLD: 7.39 PH (ref 7.35–7.45)
PH BLDV: 7.3 PH (ref 7.32–7.43)
PH BLDV: 7.31 PH (ref 7.32–7.43)
PHOSPHATE SERPL-MCNC: 2.6 MG/DL (ref 2.5–4.5)
PHOSPHATE SERPL-MCNC: 3 MG/DL (ref 2.5–4.5)
PLATELET # BLD AUTO: 110 10E9/L (ref 150–450)
PLATELET # BLD AUTO: 120 10E9/L (ref 150–450)
PLATELET # BLD AUTO: 121 10E9/L (ref 150–450)
PO2 BLD: 120 MM HG (ref 80–105)
PO2 BLD: 120 MM HG (ref 80–105)
PO2 BLD: 239 MM HG (ref 80–105)
PO2 BLD: 298 MM HG (ref 80–105)
PO2 BLD: 378 MM HG (ref 80–105)
PO2 BLD: 411 MM HG (ref 80–105)
PO2 BLDV: 44 MM HG (ref 25–47)
PO2 BLDV: 46 MM HG (ref 25–47)
POTASSIUM BLD-SCNC: 4 MMOL/L (ref 3.4–5.3)
POTASSIUM BLD-SCNC: 4.4 MMOL/L (ref 3.4–5.3)
POTASSIUM BLD-SCNC: 5.1 MMOL/L (ref 3.4–5.3)
POTASSIUM BLD-SCNC: 5.2 MMOL/L (ref 3.4–5.3)
POTASSIUM BLD-SCNC: 5.3 MMOL/L (ref 3.4–5.3)
POTASSIUM SERPL-SCNC: 3.6 MMOL/L (ref 3.4–5.3)
POTASSIUM SERPL-SCNC: 4.1 MMOL/L (ref 3.4–5.3)
PREALB SERPL IA-MCNC: 18 MG/DL (ref 15–45)
RBC # BLD AUTO: 3.81 10E12/L (ref 4.4–5.9)
RBC # BLD AUTO: 3.99 10E12/L (ref 4.4–5.9)
SODIUM BLD-SCNC: 141 MMOL/L (ref 133–144)
SODIUM SERPL-SCNC: 143 MMOL/L (ref 133–144)
SODIUM SERPL-SCNC: 144 MMOL/L (ref 133–144)
TRANSFUSION STATUS PATIENT QL: NORMAL
WBC # BLD AUTO: 19.8 10E9/L (ref 4–11)
WBC # BLD AUTO: 8.4 10E9/L (ref 4–11)

## 2017-11-17 PROCEDURE — 82947 ASSAY GLUCOSE BLOOD QUANT: CPT

## 2017-11-17 PROCEDURE — 00000146 ZZHCL STATISTIC GLUCOSE BY METER IP

## 2017-11-17 PROCEDURE — 83735 ASSAY OF MAGNESIUM: CPT | Performed by: THORACIC SURGERY (CARDIOTHORACIC VASCULAR SURGERY)

## 2017-11-17 PROCEDURE — 37000008 ZZH ANESTHESIA TECHNICAL FEE, 1ST 30 MIN: Performed by: THORACIC SURGERY (CARDIOTHORACIC VASCULAR SURGERY)

## 2017-11-17 PROCEDURE — 84295 ASSAY OF SERUM SODIUM: CPT

## 2017-11-17 PROCEDURE — 06BQ4ZZ EXCISION OF LEFT SAPHENOUS VEIN, PERCUTANEOUS ENDOSCOPIC APPROACH: ICD-10-PCS | Performed by: THORACIC SURGERY (CARDIOTHORACIC VASCULAR SURGERY)

## 2017-11-17 PROCEDURE — 93005 ELECTROCARDIOGRAM TRACING: CPT

## 2017-11-17 PROCEDURE — 25000125 ZZHC RX 250: Performed by: INTERNAL MEDICINE

## 2017-11-17 PROCEDURE — 82805 BLOOD GASES W/O2 SATURATION: CPT | Performed by: THORACIC SURGERY (CARDIOTHORACIC VASCULAR SURGERY)

## 2017-11-17 PROCEDURE — 25000128 H RX IP 250 OP 636: Performed by: NURSE ANESTHETIST, CERTIFIED REGISTERED

## 2017-11-17 PROCEDURE — 85520 HEPARIN ASSAY: CPT | Performed by: STUDENT IN AN ORGANIZED HEALTH CARE EDUCATION/TRAINING PROGRAM

## 2017-11-17 PROCEDURE — 85049 AUTOMATED PLATELET COUNT: CPT | Performed by: INTERNAL MEDICINE

## 2017-11-17 PROCEDURE — 85384 FIBRINOGEN ACTIVITY: CPT | Performed by: INTERNAL MEDICINE

## 2017-11-17 PROCEDURE — 93503 INSERT/PLACE HEART CATHETER: CPT

## 2017-11-17 PROCEDURE — 27210447 ZZH PACK CELL SAVER CSP: Performed by: THORACIC SURGERY (CARDIOTHORACIC VASCULAR SURGERY)

## 2017-11-17 PROCEDURE — 82803 BLOOD GASES ANY COMBINATION: CPT | Performed by: ANESTHESIOLOGY

## 2017-11-17 PROCEDURE — 82947 ASSAY GLUCOSE BLOOD QUANT: CPT | Performed by: ANESTHESIOLOGY

## 2017-11-17 PROCEDURE — 85610 PROTHROMBIN TIME: CPT | Performed by: STUDENT IN AN ORGANIZED HEALTH CARE EDUCATION/TRAINING PROGRAM

## 2017-11-17 PROCEDURE — 84100 ASSAY OF PHOSPHORUS: CPT | Performed by: THORACIC SURGERY (CARDIOTHORACIC VASCULAR SURGERY)

## 2017-11-17 PROCEDURE — 25000128 H RX IP 250 OP 636

## 2017-11-17 PROCEDURE — 93010 ELECTROCARDIOGRAM REPORT: CPT | Performed by: INTERNAL MEDICINE

## 2017-11-17 PROCEDURE — C9399 UNCLASSIFIED DRUGS OR BIOLOG: HCPCS | Performed by: NURSE ANESTHETIST, CERTIFIED REGISTERED

## 2017-11-17 PROCEDURE — 85027 COMPLETE CBC AUTOMATED: CPT | Performed by: STUDENT IN AN ORGANIZED HEALTH CARE EDUCATION/TRAINING PROGRAM

## 2017-11-17 PROCEDURE — A9270 NON-COVERED ITEM OR SERVICE: HCPCS | Mod: GY | Performed by: NEUROLOGICAL SURGERY

## 2017-11-17 PROCEDURE — 82803 BLOOD GASES ANY COMBINATION: CPT | Performed by: THORACIC SURGERY (CARDIOTHORACIC VASCULAR SURGERY)

## 2017-11-17 PROCEDURE — 40000275 ZZH STATISTIC RCP TIME EA 10 MIN

## 2017-11-17 PROCEDURE — 25000125 ZZHC RX 250: Performed by: THORACIC SURGERY (CARDIOTHORACIC VASCULAR SURGERY)

## 2017-11-17 PROCEDURE — 36000076 ZZH SURGERY LEVEL 6 EA 15 ADDTL MIN - UMMC: Performed by: THORACIC SURGERY (CARDIOTHORACIC VASCULAR SURGERY)

## 2017-11-17 PROCEDURE — 85027 COMPLETE CBC AUTOMATED: CPT | Performed by: THORACIC SURGERY (CARDIOTHORACIC VASCULAR SURGERY)

## 2017-11-17 PROCEDURE — 84132 ASSAY OF SERUM POTASSIUM: CPT

## 2017-11-17 PROCEDURE — 25000128 H RX IP 250 OP 636: Performed by: THORACIC SURGERY (CARDIOTHORACIC VASCULAR SURGERY)

## 2017-11-17 PROCEDURE — 83735 ASSAY OF MAGNESIUM: CPT | Performed by: STUDENT IN AN ORGANIZED HEALTH CARE EDUCATION/TRAINING PROGRAM

## 2017-11-17 PROCEDURE — 83605 ASSAY OF LACTIC ACID: CPT

## 2017-11-17 PROCEDURE — 85610 PROTHROMBIN TIME: CPT | Performed by: THORACIC SURGERY (CARDIOTHORACIC VASCULAR SURGERY)

## 2017-11-17 PROCEDURE — 25800025 ZZH RX 258: Performed by: THORACIC SURGERY (CARDIOTHORACIC VASCULAR SURGERY)

## 2017-11-17 PROCEDURE — 40000048 ZZH STATISTIC DAILY SWAN MONITORING

## 2017-11-17 PROCEDURE — 82330 ASSAY OF CALCIUM: CPT | Performed by: THORACIC SURGERY (CARDIOTHORACIC VASCULAR SURGERY)

## 2017-11-17 PROCEDURE — 99291 CRITICAL CARE FIRST HOUR: CPT | Mod: GC | Performed by: ANESTHESIOLOGY

## 2017-11-17 PROCEDURE — 82330 ASSAY OF CALCIUM: CPT | Performed by: ANESTHESIOLOGY

## 2017-11-17 PROCEDURE — 84100 ASSAY OF PHOSPHORUS: CPT | Performed by: STUDENT IN AN ORGANIZED HEALTH CARE EDUCATION/TRAINING PROGRAM

## 2017-11-17 PROCEDURE — 25000132 ZZH RX MED GY IP 250 OP 250 PS 637: Mod: GY | Performed by: INTERNAL MEDICINE

## 2017-11-17 PROCEDURE — 84132 ASSAY OF SERUM POTASSIUM: CPT | Performed by: ANESTHESIOLOGY

## 2017-11-17 PROCEDURE — 71010 XR CHEST PORT 1 VW: CPT

## 2017-11-17 PROCEDURE — 021009W BYPASS CORONARY ARTERY, ONE ARTERY FROM AORTA WITH AUTOLOGOUS VENOUS TISSUE, OPEN APPROACH: ICD-10-PCS | Performed by: THORACIC SURGERY (CARDIOTHORACIC VASCULAR SURGERY)

## 2017-11-17 PROCEDURE — P9016 RBC LEUKOCYTES REDUCED: HCPCS | Performed by: THORACIC SURGERY (CARDIOTHORACIC VASCULAR SURGERY)

## 2017-11-17 PROCEDURE — 85730 THROMBOPLASTIN TIME PARTIAL: CPT | Performed by: INTERNAL MEDICINE

## 2017-11-17 PROCEDURE — 40000196 ZZH STATISTIC RAPCV CVP MONITORING

## 2017-11-17 PROCEDURE — 25000132 ZZH RX MED GY IP 250 OP 250 PS 637: Mod: GY | Performed by: STUDENT IN AN ORGANIZED HEALTH CARE EDUCATION/TRAINING PROGRAM

## 2017-11-17 PROCEDURE — 25000565 ZZH ISOFLURANE, EA 15 MIN: Performed by: THORACIC SURGERY (CARDIOTHORACIC VASCULAR SURGERY)

## 2017-11-17 PROCEDURE — 25000132 ZZH RX MED GY IP 250 OP 250 PS 637: Mod: GY | Performed by: THORACIC SURGERY (CARDIOTHORACIC VASCULAR SURGERY)

## 2017-11-17 PROCEDURE — 82803 BLOOD GASES ANY COMBINATION: CPT

## 2017-11-17 PROCEDURE — 85730 THROMBOPLASTIN TIME PARTIAL: CPT | Performed by: THORACIC SURGERY (CARDIOTHORACIC VASCULAR SURGERY)

## 2017-11-17 PROCEDURE — 25000132 ZZH RX MED GY IP 250 OP 250 PS 637: Mod: GY | Performed by: NEUROLOGICAL SURGERY

## 2017-11-17 PROCEDURE — 25000128 H RX IP 250 OP 636: Performed by: INTERNAL MEDICINE

## 2017-11-17 PROCEDURE — 36000074 ZZH SURGERY LEVEL 6 1ST 30 MIN - UMMC: Performed by: THORACIC SURGERY (CARDIOTHORACIC VASCULAR SURGERY)

## 2017-11-17 PROCEDURE — 80048 BASIC METABOLIC PNL TOTAL CA: CPT | Performed by: THORACIC SURGERY (CARDIOTHORACIC VASCULAR SURGERY)

## 2017-11-17 PROCEDURE — 25000128 H RX IP 250 OP 636: Performed by: ANESTHESIOLOGY

## 2017-11-17 PROCEDURE — 87081 CULTURE SCREEN ONLY: CPT | Performed by: THORACIC SURGERY (CARDIOTHORACIC VASCULAR SURGERY)

## 2017-11-17 PROCEDURE — 85610 PROTHROMBIN TIME: CPT | Performed by: INTERNAL MEDICINE

## 2017-11-17 PROCEDURE — 27210460 ZZH PUMP APP ADULT PERFUSION: Performed by: THORACIC SURGERY (CARDIOTHORACIC VASCULAR SURGERY)

## 2017-11-17 PROCEDURE — 40000281 ZZH STATISTIC TRANSPORT TIME EA 15 MIN

## 2017-11-17 PROCEDURE — 27210794 ZZH OR GENERAL SUPPLY STERILE: Performed by: THORACIC SURGERY (CARDIOTHORACIC VASCULAR SURGERY)

## 2017-11-17 PROCEDURE — 84295 ASSAY OF SERUM SODIUM: CPT | Performed by: ANESTHESIOLOGY

## 2017-11-17 PROCEDURE — 80048 BASIC METABOLIC PNL TOTAL CA: CPT | Performed by: STUDENT IN AN ORGANIZED HEALTH CARE EDUCATION/TRAINING PROGRAM

## 2017-11-17 PROCEDURE — 41000018 ZZH PER-PERFUSION 1ST 30 MIN: Performed by: THORACIC SURGERY (CARDIOTHORACIC VASCULAR SURGERY)

## 2017-11-17 PROCEDURE — 20000004 ZZH R&B ICU UMMC

## 2017-11-17 PROCEDURE — 40000344 ZZHCL STATISTIC THAWING COMPONENT: Performed by: INTERNAL MEDICINE

## 2017-11-17 PROCEDURE — 37000009 ZZH ANESTHESIA TECHNICAL FEE, EACH ADDTL 15 MIN: Performed by: THORACIC SURGERY (CARDIOTHORACIC VASCULAR SURGERY)

## 2017-11-17 PROCEDURE — A9270 NON-COVERED ITEM OR SERVICE: HCPCS | Mod: GY | Performed by: THORACIC SURGERY (CARDIOTHORACIC VASCULAR SURGERY)

## 2017-11-17 PROCEDURE — 25000125 ZZHC RX 250: Performed by: ANESTHESIOLOGY

## 2017-11-17 PROCEDURE — 25000125 ZZHC RX 250

## 2017-11-17 PROCEDURE — 25000125 ZZHC RX 250: Performed by: NURSE ANESTHETIST, CERTIFIED REGISTERED

## 2017-11-17 PROCEDURE — 25000128 H RX IP 250 OP 636: Performed by: TRANSPLANT SURGERY

## 2017-11-17 PROCEDURE — 27210995 ZZH RX 272: Performed by: THORACIC SURGERY (CARDIOTHORACIC VASCULAR SURGERY)

## 2017-11-17 PROCEDURE — 83605 ASSAY OF LACTIC ACID: CPT | Performed by: THORACIC SURGERY (CARDIOTHORACIC VASCULAR SURGERY)

## 2017-11-17 PROCEDURE — A9270 NON-COVERED ITEM OR SERVICE: HCPCS | Mod: GY | Performed by: INTERNAL MEDICINE

## 2017-11-17 PROCEDURE — 40000986 XR CHEST PORT 1 VW

## 2017-11-17 PROCEDURE — 40000076 ZZH STATISTIC IABP MONITORING

## 2017-11-17 PROCEDURE — 25000128 H RX IP 250 OP 636: Performed by: NEUROLOGICAL SURGERY

## 2017-11-17 PROCEDURE — 02100Z9 BYPASS CORONARY ARTERY, ONE ARTERY FROM LEFT INTERNAL MAMMARY, OPEN APPROACH: ICD-10-PCS | Performed by: THORACIC SURGERY (CARDIOTHORACIC VASCULAR SURGERY)

## 2017-11-17 PROCEDURE — 5A1221Z PERFORMANCE OF CARDIAC OUTPUT, CONTINUOUS: ICD-10-PCS | Performed by: THORACIC SURGERY (CARDIOTHORACIC VASCULAR SURGERY)

## 2017-11-17 PROCEDURE — 25000128 H RX IP 250 OP 636: Performed by: STUDENT IN AN ORGANIZED HEALTH CARE EDUCATION/TRAINING PROGRAM

## 2017-11-17 PROCEDURE — P9041 ALBUMIN (HUMAN),5%, 50ML: HCPCS

## 2017-11-17 PROCEDURE — 82330 ASSAY OF CALCIUM: CPT

## 2017-11-17 PROCEDURE — 83605 ASSAY OF LACTIC ACID: CPT | Performed by: ANESTHESIOLOGY

## 2017-11-17 PROCEDURE — 40000014 ZZH STATISTIC ARTERIAL MONITORING DAILY

## 2017-11-17 RX ORDER — PROPOFOL 10 MG/ML
5-75 INJECTION, EMULSION INTRAVENOUS CONTINUOUS
Status: DISCONTINUED | OUTPATIENT
Start: 2017-11-17 | End: 2017-11-18

## 2017-11-17 RX ORDER — MAGNESIUM SULFATE HEPTAHYDRATE 40 MG/ML
4 INJECTION, SOLUTION INTRAVENOUS EVERY 4 HOURS PRN
Status: DISCONTINUED | OUTPATIENT
Start: 2017-11-17 | End: 2017-11-22 | Stop reason: HOSPADM

## 2017-11-17 RX ORDER — ACETAMINOPHEN 325 MG/1
975 TABLET ORAL EVERY 8 HOURS
Status: COMPLETED | OUTPATIENT
Start: 2017-11-17 | End: 2017-11-20

## 2017-11-17 RX ORDER — NITROGLYCERIN 20 MG/100ML
.07-.1 INJECTION INTRAVENOUS CONTINUOUS
Status: DISCONTINUED | OUTPATIENT
Start: 2017-11-17 | End: 2017-11-18

## 2017-11-17 RX ORDER — POTASSIUM CL/LIDO/0.9 % NACL 10MEQ/0.1L
10 INTRAVENOUS SOLUTION, PIGGYBACK (ML) INTRAVENOUS
Status: DISCONTINUED | OUTPATIENT
Start: 2017-11-17 | End: 2017-11-22 | Stop reason: HOSPADM

## 2017-11-17 RX ORDER — ONDANSETRON 4 MG/1
4 TABLET, ORALLY DISINTEGRATING ORAL EVERY 30 MIN PRN
Status: DISCONTINUED | OUTPATIENT
Start: 2017-11-17 | End: 2017-11-17

## 2017-11-17 RX ORDER — HYDRALAZINE HYDROCHLORIDE 20 MG/ML
10 INJECTION INTRAMUSCULAR; INTRAVENOUS EVERY 30 MIN PRN
Status: DISCONTINUED | OUTPATIENT
Start: 2017-11-17 | End: 2017-11-22 | Stop reason: HOSPADM

## 2017-11-17 RX ORDER — FENTANYL CITRATE 50 UG/ML
INJECTION, SOLUTION INTRAMUSCULAR; INTRAVENOUS PRN
Status: DISCONTINUED | OUTPATIENT
Start: 2017-11-17 | End: 2017-11-17

## 2017-11-17 RX ORDER — ASPIRIN 325 MG
325 TABLET ORAL DAILY
Status: DISCONTINUED | OUTPATIENT
Start: 2017-11-18 | End: 2017-11-22 | Stop reason: HOSPADM

## 2017-11-17 RX ORDER — POTASSIUM CHLORIDE 1.5 G/1.58G
20-40 POWDER, FOR SOLUTION ORAL
Status: DISCONTINUED | OUTPATIENT
Start: 2017-11-17 | End: 2017-11-22 | Stop reason: HOSPADM

## 2017-11-17 RX ORDER — LIDOCAINE HYDROCHLORIDE 20 MG/ML
INJECTION, SOLUTION INFILTRATION; PERINEURAL PRN
Status: DISCONTINUED | OUTPATIENT
Start: 2017-11-17 | End: 2017-11-17

## 2017-11-17 RX ORDER — METOPROLOL TARTRATE 25 MG/1
25 TABLET, FILM COATED ORAL EVERY 12 HOURS
Status: DISCONTINUED | OUTPATIENT
Start: 2017-11-18 | End: 2017-11-18

## 2017-11-17 RX ORDER — LIDOCAINE 40 MG/G
CREAM TOPICAL
Status: DISCONTINUED | OUTPATIENT
Start: 2017-11-17 | End: 2017-11-22 | Stop reason: HOSPADM

## 2017-11-17 RX ORDER — DEXTROSE MONOHYDRATE, SODIUM CHLORIDE, AND POTASSIUM CHLORIDE 50; 1.49; 4.5 G/1000ML; G/1000ML; G/1000ML
INJECTION, SOLUTION INTRAVENOUS CONTINUOUS
Status: DISCONTINUED | OUTPATIENT
Start: 2017-11-17 | End: 2017-11-18

## 2017-11-17 RX ORDER — FENTANYL CITRATE 50 UG/ML
25-50 INJECTION, SOLUTION INTRAMUSCULAR; INTRAVENOUS
Status: DISCONTINUED | OUTPATIENT
Start: 2017-11-17 | End: 2017-11-17

## 2017-11-17 RX ORDER — CEFAZOLIN SODIUM 1 G
1 VIAL (EA) INJECTION EVERY 8 HOURS
Status: COMPLETED | OUTPATIENT
Start: 2017-11-18 | End: 2017-11-18

## 2017-11-17 RX ORDER — SODIUM CHLORIDE, SODIUM LACTATE, POTASSIUM CHLORIDE, CALCIUM CHLORIDE 600; 310; 30; 20 MG/100ML; MG/100ML; MG/100ML; MG/100ML
INJECTION, SOLUTION INTRAVENOUS CONTINUOUS PRN
Status: DISCONTINUED | OUTPATIENT
Start: 2017-11-17 | End: 2017-11-17

## 2017-11-17 RX ORDER — DEXTROSE MONOHYDRATE 25 G/50ML
25-50 INJECTION, SOLUTION INTRAVENOUS
Status: DISCONTINUED | OUTPATIENT
Start: 2017-11-17 | End: 2017-11-18

## 2017-11-17 RX ORDER — ACETAMINOPHEN 325 MG/1
650 TABLET ORAL EVERY 4 HOURS PRN
Status: DISCONTINUED | OUTPATIENT
Start: 2017-11-20 | End: 2017-11-22 | Stop reason: HOSPADM

## 2017-11-17 RX ORDER — DOCUSATE SODIUM 100 MG/1
100 CAPSULE, LIQUID FILLED ORAL 2 TIMES DAILY
Status: DISCONTINUED | OUTPATIENT
Start: 2017-11-17 | End: 2017-11-19

## 2017-11-17 RX ORDER — ONDANSETRON 2 MG/ML
4 INJECTION INTRAMUSCULAR; INTRAVENOUS EVERY 30 MIN PRN
Status: DISCONTINUED | OUTPATIENT
Start: 2017-11-17 | End: 2017-11-17

## 2017-11-17 RX ORDER — ONDANSETRON 2 MG/ML
4 INJECTION INTRAMUSCULAR; INTRAVENOUS EVERY 6 HOURS PRN
Status: DISCONTINUED | OUTPATIENT
Start: 2017-11-17 | End: 2017-11-22 | Stop reason: HOSPADM

## 2017-11-17 RX ORDER — ONDANSETRON 4 MG/1
4 TABLET, ORALLY DISINTEGRATING ORAL EVERY 6 HOURS PRN
Status: DISCONTINUED | OUTPATIENT
Start: 2017-11-17 | End: 2017-11-22 | Stop reason: HOSPADM

## 2017-11-17 RX ORDER — PROCHLORPERAZINE MALEATE 5 MG
5 TABLET ORAL EVERY 6 HOURS PRN
Status: DISCONTINUED | OUTPATIENT
Start: 2017-11-17 | End: 2017-11-22 | Stop reason: HOSPADM

## 2017-11-17 RX ORDER — POTASSIUM CHLORIDE 7.45 MG/ML
10 INJECTION INTRAVENOUS
Status: DISCONTINUED | OUTPATIENT
Start: 2017-11-17 | End: 2017-11-22 | Stop reason: HOSPADM

## 2017-11-17 RX ORDER — PROTAMINE SULFATE 10 MG/ML
INJECTION, SOLUTION INTRAVENOUS PRN
Status: DISCONTINUED | OUTPATIENT
Start: 2017-11-17 | End: 2017-11-17

## 2017-11-17 RX ORDER — POTASSIUM CHLORIDE 750 MG/1
20-40 TABLET, EXTENDED RELEASE ORAL
Status: DISCONTINUED | OUTPATIENT
Start: 2017-11-17 | End: 2017-11-22 | Stop reason: HOSPADM

## 2017-11-17 RX ORDER — SODIUM CHLORIDE, SODIUM LACTATE, POTASSIUM CHLORIDE, CALCIUM CHLORIDE 600; 310; 30; 20 MG/100ML; MG/100ML; MG/100ML; MG/100ML
INJECTION, SOLUTION INTRAVENOUS CONTINUOUS
Status: DISCONTINUED | OUTPATIENT
Start: 2017-11-17 | End: 2017-11-17

## 2017-11-17 RX ORDER — MUPIROCIN 20 MG/G
0.5 OINTMENT TOPICAL 2 TIMES DAILY
Status: DISCONTINUED | OUTPATIENT
Start: 2017-11-17 | End: 2017-11-19

## 2017-11-17 RX ORDER — POTASSIUM CHLORIDE 29.8 MG/ML
20 INJECTION INTRAVENOUS
Status: DISCONTINUED | OUTPATIENT
Start: 2017-11-17 | End: 2017-11-18 | Stop reason: RX

## 2017-11-17 RX ORDER — PROPOFOL 10 MG/ML
INJECTION, EMULSION INTRAVENOUS CONTINUOUS PRN
Status: DISCONTINUED | OUTPATIENT
Start: 2017-11-17 | End: 2017-11-17

## 2017-11-17 RX ORDER — NICOTINE POLACRILEX 4 MG
15-30 LOZENGE BUCCAL
Status: DISCONTINUED | OUTPATIENT
Start: 2017-11-17 | End: 2017-11-18

## 2017-11-17 RX ORDER — NALOXONE HYDROCHLORIDE 0.4 MG/ML
.1-.4 INJECTION, SOLUTION INTRAMUSCULAR; INTRAVENOUS; SUBCUTANEOUS
Status: DISCONTINUED | OUTPATIENT
Start: 2017-11-17 | End: 2017-11-22 | Stop reason: HOSPADM

## 2017-11-17 RX ORDER — METOPROLOL TARTRATE 1 MG/ML
1-2 INJECTION, SOLUTION INTRAVENOUS EVERY 5 MIN PRN
Status: DISCONTINUED | OUTPATIENT
Start: 2017-11-17 | End: 2017-11-17

## 2017-11-17 RX ORDER — HYDROMORPHONE HYDROCHLORIDE 1 MG/ML
.3-.5 INJECTION, SOLUTION INTRAMUSCULAR; INTRAVENOUS; SUBCUTANEOUS
Status: DISCONTINUED | OUTPATIENT
Start: 2017-11-17 | End: 2017-11-22 | Stop reason: HOSPADM

## 2017-11-17 RX ORDER — MEPERIDINE HYDROCHLORIDE 50 MG/ML
12.5-25 INJECTION INTRAMUSCULAR; INTRAVENOUS; SUBCUTANEOUS
Status: DISCONTINUED | OUTPATIENT
Start: 2017-11-17 | End: 2017-11-18

## 2017-11-17 RX ORDER — PAPAVERINE HYDROCHLORIDE 30 MG/ML
INJECTION INTRAMUSCULAR; INTRAVENOUS PRN
Status: DISCONTINUED | OUTPATIENT
Start: 2017-11-17 | End: 2017-11-17 | Stop reason: HOSPADM

## 2017-11-17 RX ORDER — OXYCODONE HYDROCHLORIDE 5 MG/1
5-10 TABLET ORAL EVERY 4 HOURS PRN
Status: DISCONTINUED | OUTPATIENT
Start: 2017-11-17 | End: 2017-11-18

## 2017-11-17 RX ORDER — ALBUMIN, HUMAN INJ 5% 5 %
500-1000 SOLUTION INTRAVENOUS
Status: DISCONTINUED | OUTPATIENT
Start: 2017-11-17 | End: 2017-11-22 | Stop reason: HOSPADM

## 2017-11-17 RX ORDER — HEPARIN SODIUM 1000 [USP'U]/ML
INJECTION, SOLUTION INTRAVENOUS; SUBCUTANEOUS PRN
Status: DISCONTINUED | OUTPATIENT
Start: 2017-11-17 | End: 2017-11-17

## 2017-11-17 RX ORDER — ETOMIDATE 2 MG/ML
INJECTION INTRAVENOUS PRN
Status: DISCONTINUED | OUTPATIENT
Start: 2017-11-17 | End: 2017-11-17

## 2017-11-17 RX ADMIN — CEFAZOLIN SODIUM 1 G: 1 INJECTION, SOLUTION INTRAVENOUS at 17:09

## 2017-11-17 RX ADMIN — ROCURONIUM BROMIDE 100 MG: 10 INJECTION INTRAVENOUS at 12:17

## 2017-11-17 RX ADMIN — DOCUSATE SODIUM 100 MG: 100 CAPSULE, LIQUID FILLED ORAL at 20:03

## 2017-11-17 RX ADMIN — PROPOFOL 30 MCG/KG/MIN: 10 INJECTION, EMULSION INTRAVENOUS at 17:13

## 2017-11-17 RX ADMIN — MUPIROCIN 1 G: 20 OINTMENT TOPICAL at 07:58

## 2017-11-17 RX ADMIN — ACETAMINOPHEN 975 MG: 325 TABLET, FILM COATED ORAL at 07:56

## 2017-11-17 RX ADMIN — PROPOFOL 40 MCG/KG/MIN: 10 INJECTION, EMULSION INTRAVENOUS at 20:00

## 2017-11-17 RX ADMIN — POTASSIUM CHLORIDE 20 MEQ: 750 TABLET, EXTENDED RELEASE ORAL at 05:10

## 2017-11-17 RX ADMIN — SODIUM CHLORIDE, PRESERVATIVE FREE 5 ML: 5 INJECTION INTRAVENOUS at 19:22

## 2017-11-17 RX ADMIN — FENTANYL CITRATE 100 MCG: 50 INJECTION, SOLUTION INTRAMUSCULAR; INTRAVENOUS at 17:45

## 2017-11-17 RX ADMIN — SODIUM CHLORIDE 1 G: 9 INJECTION, SOLUTION INTRAVENOUS at 13:50

## 2017-11-17 RX ADMIN — MIDAZOLAM HYDROCHLORIDE 1 MG: 1 INJECTION, SOLUTION INTRAMUSCULAR; INTRAVENOUS at 12:00

## 2017-11-17 RX ADMIN — PROCHLORPERAZINE MALEATE 5 MG: 5 TABLET, FILM COATED ORAL at 00:55

## 2017-11-17 RX ADMIN — HEPARIN SODIUM 32000 UNITS: 1000 INJECTION, SOLUTION INTRAVENOUS; SUBCUTANEOUS at 14:38

## 2017-11-17 RX ADMIN — FENTANYL CITRATE 300 MCG: 50 INJECTION, SOLUTION INTRAMUSCULAR; INTRAVENOUS at 13:53

## 2017-11-17 RX ADMIN — ACETAMINOPHEN 975 MG: 325 TABLET, FILM COATED ORAL at 19:00

## 2017-11-17 RX ADMIN — PROTAMINE SULFATE 350 MG: 10 INJECTION, SOLUTION INTRAVENOUS at 16:23

## 2017-11-17 RX ADMIN — PHENYLEPHRINE HYDROCHLORIDE 100 MCG: 10 INJECTION, SOLUTION INTRAMUSCULAR; INTRAVENOUS; SUBCUTANEOUS at 12:17

## 2017-11-17 RX ADMIN — ETOMIDATE 20 MG: 2 INJECTION INTRAVENOUS at 12:17

## 2017-11-17 RX ADMIN — NOREPINEPHRINE BITARTRATE 0.03 MCG/KG/MIN: 1 INJECTION INTRAVENOUS at 15:05

## 2017-11-17 RX ADMIN — EPINEPHRINE 0.03 MCG/KG/MIN: 1 INJECTION INTRAMUSCULAR; INTRAVENOUS; SUBCUTANEOUS at 16:02

## 2017-11-17 RX ADMIN — LIDOCAINE HYDROCHLORIDE 100 MG: 20 INJECTION, SOLUTION INFILTRATION; PERINEURAL at 12:17

## 2017-11-17 RX ADMIN — FENTANYL CITRATE 300 MCG: 50 INJECTION, SOLUTION INTRAMUSCULAR; INTRAVENOUS at 13:29

## 2017-11-17 RX ADMIN — CEFAZOLIN SODIUM 2 G: 2 INJECTION, SOLUTION INTRAVENOUS at 13:18

## 2017-11-17 RX ADMIN — SODIUM CHLORIDE, POTASSIUM CHLORIDE, SODIUM LACTATE AND CALCIUM CHLORIDE: 600; 310; 30; 20 INJECTION, SOLUTION INTRAVENOUS at 12:36

## 2017-11-17 RX ADMIN — PROPOFOL 40 MCG/KG/MIN: 10 INJECTION, EMULSION INTRAVENOUS at 20:42

## 2017-11-17 RX ADMIN — ROCURONIUM BROMIDE 50 MG: 10 INJECTION INTRAVENOUS at 13:37

## 2017-11-17 RX ADMIN — SODIUM CHLORIDE, POTASSIUM CHLORIDE, SODIUM LACTATE AND CALCIUM CHLORIDE: 600; 310; 30; 20 INJECTION, SOLUTION INTRAVENOUS at 12:00

## 2017-11-17 RX ADMIN — POTASSIUM CHLORIDE, DEXTROSE MONOHYDRATE AND SODIUM CHLORIDE: 150; 5; 450 INJECTION, SOLUTION INTRAVENOUS at 19:01

## 2017-11-17 RX ADMIN — Medication 2 G: at 11:13

## 2017-11-17 RX ADMIN — HYDROMORPHONE HYDROCHLORIDE 0.5 MG: 1 INJECTION, SOLUTION INTRAMUSCULAR; INTRAVENOUS; SUBCUTANEOUS at 23:36

## 2017-11-17 RX ADMIN — FENTANYL CITRATE 200 MCG: 50 INJECTION, SOLUTION INTRAMUSCULAR; INTRAVENOUS at 12:17

## 2017-11-17 RX ADMIN — MUPIROCIN 0.5 G: 20 OINTMENT TOPICAL at 20:03

## 2017-11-17 RX ADMIN — POTASSIUM CHLORIDE 20 MEQ: 750 TABLET, EXTENDED RELEASE ORAL at 10:04

## 2017-11-17 RX ADMIN — HYDROMORPHONE HYDROCHLORIDE 0.5 MG: 1 INJECTION, SOLUTION INTRAMUSCULAR; INTRAVENOUS; SUBCUTANEOUS at 19:56

## 2017-11-17 RX ADMIN — HEPARIN SODIUM 2000 UNITS: 1000 INJECTION, SOLUTION INTRAVENOUS; SUBCUTANEOUS at 14:00

## 2017-11-17 RX ADMIN — HEPARIN SODIUM 650 UNITS/HR: 10000 INJECTION, SOLUTION INTRAVENOUS at 05:39

## 2017-11-17 RX ADMIN — CEFAZOLIN SODIUM 1 G: 1 INJECTION, SOLUTION INTRAVENOUS at 15:18

## 2017-11-17 RX ADMIN — ROCURONIUM BROMIDE 50 MG: 10 INJECTION INTRAVENOUS at 16:16

## 2017-11-17 RX ADMIN — SUGAMMADEX 200 MG: 100 INJECTION, SOLUTION INTRAVENOUS at 17:54

## 2017-11-17 RX ADMIN — FENTANYL CITRATE 100 MCG: 50 INJECTION, SOLUTION INTRAMUSCULAR; INTRAVENOUS at 16:46

## 2017-11-17 RX ADMIN — FENTANYL CITRATE 200 MCG: 50 INJECTION, SOLUTION INTRAMUSCULAR; INTRAVENOUS at 16:20

## 2017-11-17 RX ADMIN — METOPROLOL TARTRATE 12.5 MG: 25 TABLET, FILM COATED ORAL at 07:58

## 2017-11-17 RX ADMIN — SODIUM CHLORIDE 1 G: 9 INJECTION, SOLUTION INTRAVENOUS at 13:26

## 2017-11-17 RX ADMIN — ROCURONIUM BROMIDE 50 MG: 10 INJECTION INTRAVENOUS at 15:05

## 2017-11-17 RX ADMIN — OXYCODONE HYDROCHLORIDE 10 MG: 5 TABLET ORAL at 23:24

## 2017-11-17 ASSESSMENT — PAIN DESCRIPTION - DESCRIPTORS: DESCRIPTORS: SORE

## 2017-11-17 NOTE — BRIEF OP NOTE
[facility] Brief Operative Note    Pre-operative diagnosis: Coronary Artery Disease    Post-operative diagnosis * No post-op diagnosis entered *   Procedure: Procedure(s):  Median Sternotomy, Coronary Artery Bypass Graft x 2 using Left Internal Mammary and Left Endoscopic Greater Saphenous Vein [place]     on pump oxygenator - Wound Class: I-Clean   Surgeon: Rod Sanchez MD   Assistants(s): David Thompson MD; Khari Romero MD; TESSY Edwards   Estimated blood loss: 1000cc    Specimens: None   Findings: See operative report

## 2017-11-17 NOTE — ANESTHESIA PROCEDURE NOTES
Arterial Line Procedure Note  Staff:     Anesthesiologist:  ROBIN ARRIAGA    Resident/CRNA:  KIANA LYLES    Arterial line performed by resident/CRNA in presence of a teaching physician    Location: In OR After Induction  Procedure Start/Stop Times:     patient identified, IV checked, site marked, risks and benefits discussed, informed consent, monitors and equipment checked, pre-op evaluation and at physician/surgeon's request      Correct Patient: Yes      Correct Position: Yes      Correct Site: Yes      Correct Procedure: Yes      Correct Laterality:  Yes    Site Marked:  Yes  Line Placement:     Procedure:  Arterial Line    Insertion Site:  Radial    Insertion laterality:  Left    Skin Prep: Chloraprep      Patient Prep: patient draped, mask, sterile gloves, hat and hand hygiene      Local skin infiltration:  None    Ultrasound Guided?: Yes      Artery evaluated via ultrasound confirming patency.   Using realtime imaging, the artery was punctured and the needle was observed entering the artery.      A permanent image is entered into patient's chart.      Catheter size:  20 gauge, 12 cm    Cath secured with: suture      Dressing:  Tegaderm and Occlusive Gauze    Complications:  None obvious    Arterial waveform: Yes      IBP within 10% of NIBP: Yes

## 2017-11-17 NOTE — H&P
CV ICU Progress Note    POD: Day of Surgery  LOS: 2    Admission History: Luis Eduardo Oliver is a 77 year old male admitted to the ICU for postoperative management following a 2 vessel CABG by Dr Sanchez on 17.  He also has a PMH of Unstable angina 2/2 severe left main disease s/p IABP s/p CABG, Seizure disorder, BPH, and Rob's esophagus.      S/Interval History:  The patient underwent the above stated procedure.  Intraoperative complications included need for direct cardioversion after coming off bypass.  EBL was 1000.  Products received during the case included 200 mL Cell Saver.  They presented to the CVICU on NE.  A standardized sign out was conducted upon presentation to the CVICU.      O:    Temp: 100.9  F (38.3  C) Temp  Min: 37.4  F (3  C)  Max: 100.9  F (38.3  C)  Resp: 16 Resp  Min: 8  Max: 22  SpO2: 99 % SpO2  Min: 87 %  Max: 100 %    No Data Recorded  Heart Rate: 108 Heart Rate  Min: 61  Max: 119  BP: 128/57 Systolic (24hrs), Av , Min:123 , Max:137   Diastolic (24hrs), Av, Min:57, Max:72    Resp: 21  Date 17 0700 - 17 0659   Shift 3224-0476 9814-6918 6079-3194 24 Hour Total   I  N  T  A  K  E   I.V. 114.13 1600  1714.13    Shift Total  (mL/kg) 114.13  (1.43) 1600  (20.08)  1714.13  (21.51)   O  U  T  P  U  T   Urine 380 125  505    Shift Total  (mL/kg) 380  (4.77) 125  (1.57)  505  (6.34)   Weight (kg) 79.7 79.7 79.7 79.7         Past Medical History:   Diagnosis Date     BPH (benign prostatic hyperplasia)      Cholelithiasis      Seizure disorder (H)        Past Surgical History:   Procedure Laterality Date     APPENDECTOMY       CHOLECYSTECTOMY       COLONOSCOPY  2017     UPPER GI ENDOSCOPY  2017     VASECTOMY         Physical Exam    General: Intubated, lightly sedated, in no distress   Neck: Supple, no tracheal deviation  Cardiovascular: RRR  Pumonary: Non labored breathing on MV.  CTAB   Abdomen: Soft, non distended, non tender.   Ext: W/o edema, wwp  Neuro:  Non focal     Lab Results   Component Value Date    WBC 16.6 (H) 11/18/2017    HGB 11.6 (L) 11/18/2017    HCT 35.5 (L) 11/18/2017     (L) 11/18/2017     11/18/2017    POTASSIUM 5.1 11/18/2017    CHLORIDE 110 (H) 11/18/2017    CO2 22 11/18/2017    BUN 15 11/18/2017    CR 0.82 11/18/2017     (H) 11/18/2017    TROPI 0.017 11/15/2017    AST 18 11/16/2017    ALT 37 11/16/2017    ALKPHOS 108 11/16/2017    BILITOTAL 0.2 11/16/2017    INR 1.42 (H) 11/18/2017         - MEDICATION INSTRUCTIONS -       HEParin Stopped (11/17/17 0752)     nitroGLYcerin 0.2 mcg/kg/min (11/17/17 1200)         Assessment and Plan: Luis Eduardo Oliver is a 77 year old male admitted to the ICU for    Neuro:     Sedated on propofol gtt    Scheduled Tylenol    PRN Oxy, Dilaudid    Seizure disorder    Holding home Phenytoin  Resp:     Intubated and mechanically ventilated.    Wean mechanical ventilation as tolerated  CV:     Unstable angina 2/2 severe left main disease s/p 2 vessel CABG (11/17/17)    Maintain MAPs >60 & SBP <140    Support BP w/ pressors to meet goals    DVT prophylaxis after 24 hours post-surgery    Beta blocker, Statin, ASA once stable    Hemodynamically stable  GI/FEN:     NPO    Rob's esophagus    PPI    Holding home Omeprazole  Renal:     Monitor electrolytes, Cr, and urine output     Lytes goals K>4 and Mag>2    Strict I/O's    BPH    Holding Finasteride & Tamsulosin  Endo:    q2h glucose checks, Insulin gtt to serum glucose >80, <180 mg/dL  ID:     Perioperative antibiotics    Monitor WBC daily  Heme:     Monitor Hgb    Goal >8.0  Prophylaxis: SCDs, PPI  Lines:   PIV x2     CVC  Day of Surgery    [place] Day of Surgery      Dispo: Continue ICU care  Code Status: Full Code    Eddy Pablo DO  Anesthesiology Resident  CVICU Service, *[phone]    Patient seen and staffed with attending.

## 2017-11-17 NOTE — OP NOTE
OPERATIVE DATE: 11/17/2017    PRE-OPERATIVE DIAGNOSIS:  1) Severe coronary artery disease    POST-OPERATIVE DIAGNOSIS:  1) Severe coronary artery disease    PROCEDURE:  1) Coronary artery bypass grafting x 2   - Left internal mammary artery to left anterior descending artery   - Reversed saphenous vein graft to obtuse marginal artery 1    SURGEON: Rod Sanchez MD    COSURGEON: David Thompson MD - a second surgeon was necessary due to the lack of qualified fellow    ASSISTANTS: Fred Mata, PAC    ANESTHESIA: GETA    ESTIMATED BLOOD LOSS: 1000cc    OPERATIVE FINDINGS:  1) EF 60%  2) Left internal mammary artery 3mm with excellent flow  3) Left greater saphenous vein 5mm acceptable for conduit  4) Left anterior descending artery 2.5mm and free of disease at anastomosis  5) Obtuse marginal artery 1 2.5mm and free of disease at anastomosis    CARDIOPULMONARY BYPASS TIME: 71 minutes    AORTIC CROSS CLAMP TIME: 53 minutes    INDICATIONS:  Mr. TEMO MCKEON is a 77 year old male admitted with severe coronary artery disease.  We were asked to evaluate for coronary revascularization.  Risks and benefits of the operation were explained to the patient and their family including, but not limited to, bleeding, infection, stroke and even death.  They understood these risks and agreed to proceed electively.    OPERATIVE REPORT:  The patient was transferred to the operating room and positioned supine on the OR table.  General anesthesia was initiated by the anesthesia team.  Endotracheal intubation and central venous access was performed by anesthesia.  The patients neck, chest, abdomen and bilateral lower extremities were clipped, prepped and draped in sterile fashion.  A pre-procedure time-out was performed confirming the correct patient, correct site and correct procedure.    Simultaneous median sternotomy and left lower extremity skin incisions were made.  Endoscopic vein harvest of the left greater saphenous vein was  performed.  The vein was ligated at the proximal and distal ends, harvested from the leg, cannulated in reverse orientation, and flushed with heparinized saline.  Branches of the vein were triply clipped with metal clips.  The vein was then stored in heparinized saline.    Median sternotomy was made with reciprocating saw.  The bone was made hemostatic with cautery and bone wax.  A mammary retractor was placed.  The left pleural space was opened.  The left internal mammary artery was mobilized.  Branches of the artery were clipped with metal clips and divided with cautery.      The patient was given 400 mg/kg IV heparin.  The mammary pedicle was clamped with a tonsil clamp.  The mammary artery was divided with metzenbaum scissors.  There was excellent pulsatile flow from the mammary artery.  This was controlled with a bulldog clamp.  The distal aspect was tied with 0-ethibond tie and double clipped.  The proximal end was spatulated in preparation for anastomosis and flushed with papavarine.      Pledgeted 2-0 ethibond pursestring was made in the ascending aorta.  A 19F EOPA arterial cannula was placed here and connected to the bypass circuit.  A 2-0 ethibond pursestring was made in the right atrial appendage.  A 32/40F dual lumen venous cannula was placed here and connected to the cardiopulmonary bypass circuit.  A 4-0 prolene pursestring was made in the right atrium.  A stab incision was made here.  A retrograde cardioplegia catheter was placed and connected to the cardioplegia circuit.  Next the aorto-pulmonary window was developed.  A 4-0 prolene pursestring was made in the ascending aorta.  A DLP root vent / antegrade cardioplegia catheter was placed here and connected to the cardioplegia circuit.    Cardiopulmonary bypass was initiated with good flows.  Flow on the circuit was decreased.  A large aortic cross clamp was applied.  Flow on the circuit was resumed.  1000cc of retrograde cold blood cardioplegia was  delivered with good diastolic arrest.      We focused our attention on the distal bypass anastomoses next.    The following bypasses were constructed using reversed saphenous vein in end-to-side fashion with running 7-0 prolene:  1) Reversed saphenous vein graft to obtuse marginal artery 1.  The anastomosis was tested by flushing with cold blood cardioplegia to ensure hemostasis.  An additional dose of retrograde cold blood cardioplegia was delivered between completion of each anastomosis.    Next a rent was made in the left pericardium.  The left internal mammary artery was then anastomosed to the left anterior descending artery in end-to-side fashion using running 8-0 prolene.  The mammary pedicle clamp was removed.  The anastomosis was hemostatic.  The clamp was replaced.  The pedicle was tacked to the epicardium using 6-0 prolene.    We focused our attention on the proximal vein graft anastomoses next.  The vein graft was sized to fit to the ascending aorta.  One aortotomy was made with 11-blade knife.  The aortotomy was extended with 4mm punch.  The vein graft was anastomosed to the ascending aorta in end-to-side fashion using running 6-0 prolene.    A retrograde hot shot of warm blood cardiplegia was delivered.  The clamp on the mammary pedicle was removed.  A bulldog clamp was placed on the proximal vein grafts.  After completion of the hot shot, flow on the bypass circuit was decreased and the aortic cross clamp was removed.  Flow on the bypass circuit was resumed.  The proximal vein grafts were de-aired using an insulin needle.  The bulldog clamp on the vein grafts was removed.  The retrograde cardioplegia catheter was removed.  The pursestring was tied.  The site was over-sewn with a 4-0 prolene.  The distal anastomoses were inspected and hemostatic.  Ventricular pacing wires were placed and delivered through the anterior abdominal wall and secured to the skin with 0-ethibond stitches.  The patient had  sinus rhythm and was paced at 90 bpm.      The left pleural space was suctioned dry.  The lungs were ventilated bilaterally.  LAW showed no intra-cardiac air.  The DLP root vent / antegrade cardioplegia catheter was removed.  Intravenous calcium was administered.  Flow on the bypass circuit was weaned to off.  The patient was stable on low dose epinephrine and nitroglycerin.  The venous cannula was removed.  Pursestring at this site was tied.  This was over-sewed with 0-ethibond.  The remaining pump blood volume was returned via the arterial cannula.  A test dose of protamine was administered.  The arterial cannula was removed.  The pursestrings at this site were tied.  This was oversewn with pledgeted 4-0 prolene.     The sternal retractor was removed.  Two 36F straight argyle mediastinal chest tubes were placed and one 28F left pleural chest tube was placed.  These were delivered through the anterior abdominal wall and secured to the skin with 0-ethibond stitches.      The wound was made hemostatic with cautery.  The subcutaneous tissue, sternal edges, thymic fat, pericardial edges and all anastomotic and cannulation sites were inspected and hemostatic.    The wound was irrigated with warm antibiotic saline.  The sternum was reapproximated with sternal wires    The wound was made hemostatic then closed in layers using vicryl stitches.  The subcutaneous tissue was closed with 2-0 vicryl stitches.  The skin was closed with 3-0 vicryl.  Dermabond skin glue was applied.  A sterile dressing was applied.      The patient was then transferred from the operating bed to an ICU bed and transferred to the ICU in critical, but stable, condition.    All needle, sponge and instrument counts were correct at the end of the case.    Rod Sanchez  Cardiothoracic Surgery  Pager: [phone]

## 2017-11-17 NOTE — ANESTHESIA PROCEDURE NOTES
LAW Probe Insertion Note  Probe Inserted by: ROBIN ARRIAGA   Insertion method: LAW probe easily inserted   Bite block used:   Yes      Probe type:  Adult 2D   Insertion complications: No complications.      Billing for LAW report is being done by Anesthesiology

## 2017-11-17 NOTE — ANESTHESIA PROCEDURE NOTES
PA Catheter Insertion Note  Anesthesiologist: ROBIN ARRIAGA  Resident:  KIANA LYLES   PA Catheter placed by resident/CRNA in the presence of a teaching physician.  Introducer: Introducer placed as part of procedure (SEE separate note)   Skin prep:  Chloraprep Cap, Full body drape, hand hygiene, Mask, Sterile gloves and Sterile gown    PA Catheter type:  Thermodilution    Distance catheter advanced:  51 cm.    Appropriate RA, RV, PA  waveforms?: Yes    Dressing:  Biopatch and Tegaderm    Complications:  None apparent

## 2017-11-17 NOTE — ANESTHESIA PROCEDURE NOTES
Perioperative LAW Report  Anesthesia Information  LAW probe placed and report generated by: : ROBIN ARRIAGA MATTHEW DOUGLAS          Preanesthesia Checklist:  Patient identified, IV assessed, risks and benefits discussed, monitors and equipment assessed, procedure being performed at surgeon's request and anesthesia consent obtained.  General Procedure Information  Modalities:  2D  Diagnostic indications for LAW:   Cardiomyopathy, other                          .    Echocardiographic and Doppler Measurements  Right Ventricle:  Cavity size normal.   Hypertrophy not present.   Thrombus not present.    Global function normal.     Left Ventricle:  Cavity size normal.   Hypertrophy not present.   Thrombus not present.   Global Function normal.   Ejection Fraction 55%.      Ventricular Regional Function:  1- Basal Anteroseptal:  normal  2- Basal Anterior:  normal  3- Basal Anterolateral:  normal  4- Basal Inferolateral:  normal  5- Basal Inferior:  normal  6- Basal Inferoseptal:  normal  7- Mid Anteroseptal:  normal  8- Mid Anterior:  normal  9- Mid Anterolateral:  normal  10- Mid Inferolateral:  normal  11- Mid Inferior:  normal  12- Mid Inferoseptal:  normal  13- Apical Anterior:  normal  14- Apical Lateral:  normal  15- Apical Inferior:  normal  16- Apical Septal:  normal  17- Simms:  normal    Valves  Aortic Valve: Annulus normal.  Stenosis not present.  Regurgitation +3.  Leaflets normal.  Leaflet motions normal.    Mitral Valve: Annulus normal.  Stenosis not present.  Regurgitation +1.  Leaflets normal.  Leaflet motions normal.    Tricuspid Valve: Annulus normal.  Stenosis not present.  Regurgitation +1.  Leaflets normal.  Leaflet motions normal.    Pulmonic Valve: Annulus normal.  Stenosis not present.  Regurgitation absent.      Aorta: Ascending Aorta: Size normal.        Right Atrium:  Size normal.    Left Atrium: Size normal.  Left atrial appendage normal.     Atrial Septum: Intra-atrial  septal morphology normal.     Ventricular Septum: Intra-ventricular septum morphology normal.       Other Findings:   Pericardium:  normal.  . .  .  Cornoary sinus catheter present.  .  .  Post Intervention Findings  Procedure(s) performed:  CABG. Global function:  Unchanged.   Regional wall motion:  Unchanged   Surgeon(s) notified of all postintervention findings:  Yes  .  .  .   .  .  .  .  .  .  .        Echocardiogram Comments

## 2017-11-17 NOTE — ANESTHESIA PROCEDURE NOTES
Central Line Procedure Note  Staff:     Anesthesiologist:  ROBIN ARRIAGA    Resident/CRNA:  KIANA LYLES    Central line placed by Resident/CRNA in the presence of a teaching physician    Location: In OR after induction  Procedure Start/Stop Times:     patient identified, IV checked, site marked, risks and benefits discussed, informed consent, monitors and equipment checked, pre-op evaluation and at physician/surgeon's request      Correct Patient: Yes      Correct Position: Yes      Correct Site: Yes      Correct Procedure: Yes      Correct Laterality:  Yes    Site Marked:  Yes  Line Placement:     Procedure:  Central Line    Insertion laterality:  Right    Insertion site:  Internal Jugular    Position:  Trendelenburg      Maximal Sterile Barriers: All elements of maximal sterile barrier technique followed      (Maximal sterile barriers include:   Sterile gown, Sterile Gloves, Mask, Cap, Whole body draped, hand hygiene and acceptable skin prep).Skin Prep: Chloraprep         Injection Technique:  Ultrasound guided    Sterile Ultrasound Technique:  Sterile probe cover and Sterile gel    Vein evaluated via U/S for patency/adequacy of catheter insertion and is adequate.  Using realtime U/S imaging the vein was punctured, and needle was observed entering vein on U/S      Permanent Image entered into patient's record      Local skin infiltration:  None    Catheter size:  9 Fr, 2 lumen 11.5 cm (MAC)    Catheter length at skin (cm):  15    Cath secured with: suture      Dressing:  Tegaderm and Biopatch    Complications:  None obvious    Blood aspirated all lumens: Yes      All Lumens Flushed: Yes      Verification method:  Placement to be verified post-op

## 2017-11-17 NOTE — PLAN OF CARE
Problem: Patient Care Overview  Goal: Individualization & Mutuality  D: Transferred of [facility] 11/15 with IABP for CABG on 11/17. Alert and oriented. VVS.   A/I: IABP 1:1, 100% augmented. Electrolytes replaced. Pre-op scrub done. Heparin decrease to 650 units.  P: CABG in AM, will continue to monitor and notify MD of any changes.

## 2017-11-18 ENCOUNTER — APPOINTMENT (OUTPATIENT)
Dept: GENERAL RADIOLOGY | Facility: CLINIC | Age: 77
DRG: 236 | End: 2017-11-18
Attending: SURGERY
Payer: MEDICARE

## 2017-11-18 ENCOUNTER — APPOINTMENT (OUTPATIENT)
Dept: OCCUPATIONAL THERAPY | Facility: CLINIC | Age: 77
DRG: 236 | End: 2017-11-18
Attending: THORACIC SURGERY (CARDIOTHORACIC VASCULAR SURGERY)
Payer: MEDICARE

## 2017-11-18 ENCOUNTER — APPOINTMENT (OUTPATIENT)
Dept: GENERAL RADIOLOGY | Facility: CLINIC | Age: 77
DRG: 236 | End: 2017-11-18
Attending: THORACIC SURGERY (CARDIOTHORACIC VASCULAR SURGERY)
Payer: MEDICARE

## 2017-11-18 LAB
ANION GAP SERPL CALCULATED.3IONS-SCNC: 7 MMOL/L (ref 3–14)
ANION GAP SERPL CALCULATED.3IONS-SCNC: 9 MMOL/L (ref 3–14)
APTT PPP: 36 SEC (ref 22–37)
BASE DEFICIT BLDA-SCNC: 0.4 MMOL/L
BASE DEFICIT BLDV-SCNC: 0.4 MMOL/L
BUN SERPL-MCNC: 15 MG/DL (ref 7–30)
BUN SERPL-MCNC: 23 MG/DL (ref 7–30)
CA-I SERPL ISE-MCNC: 4.6 MG/DL (ref 4.4–5.2)
CALCIUM SERPL-MCNC: 8 MG/DL (ref 8.5–10.1)
CALCIUM SERPL-MCNC: 8.5 MG/DL (ref 8.5–10.1)
CHLORIDE SERPL-SCNC: 100 MMOL/L (ref 94–109)
CHLORIDE SERPL-SCNC: 110 MMOL/L (ref 94–109)
CO2 SERPL-SCNC: 22 MMOL/L (ref 20–32)
CO2 SERPL-SCNC: 26 MMOL/L (ref 20–32)
CREAT SERPL-MCNC: 0.82 MG/DL (ref 0.66–1.25)
CREAT SERPL-MCNC: 1.09 MG/DL (ref 0.66–1.25)
ERYTHROCYTE [DISTWIDTH] IN BLOOD BY AUTOMATED COUNT: 13.1 % (ref 10–15)
ERYTHROCYTE [DISTWIDTH] IN BLOOD BY AUTOMATED COUNT: 13.3 % (ref 10–15)
ERYTHROCYTE [DISTWIDTH] IN BLOOD BY AUTOMATED COUNT: 13.4 % (ref 10–15)
GFR SERPL CREATININE-BSD FRML MDRD: 65 ML/MIN/1.7M2
GFR SERPL CREATININE-BSD FRML MDRD: 90 ML/MIN/1.7M2
GLUCOSE BLDC GLUCOMTR-MCNC: 120 MG/DL (ref 70–99)
GLUCOSE BLDC GLUCOMTR-MCNC: 126 MG/DL (ref 70–99)
GLUCOSE BLDC GLUCOMTR-MCNC: 127 MG/DL (ref 70–99)
GLUCOSE BLDC GLUCOMTR-MCNC: 132 MG/DL (ref 70–99)
GLUCOSE BLDC GLUCOMTR-MCNC: 134 MG/DL (ref 70–99)
GLUCOSE BLDC GLUCOMTR-MCNC: 136 MG/DL (ref 70–99)
GLUCOSE SERPL-MCNC: 127 MG/DL (ref 70–99)
GLUCOSE SERPL-MCNC: 151 MG/DL (ref 70–99)
HBA1C MFR BLD: 5.1 % (ref 4.3–6)
HCO3 BLD-SCNC: 26 MMOL/L (ref 21–28)
HCO3 BLDV-SCNC: 26 MMOL/L (ref 21–28)
HCT VFR BLD AUTO: 30.3 % (ref 40–53)
HCT VFR BLD AUTO: 35.3 % (ref 40–53)
HCT VFR BLD AUTO: 35.5 % (ref 40–53)
HGB BLD-MCNC: 10 G/DL (ref 13.3–17.7)
HGB BLD-MCNC: 11.6 G/DL (ref 13.3–17.7)
HGB BLD-MCNC: 11.7 G/DL (ref 13.3–17.7)
INR PPP: 1.42 (ref 0.86–1.14)
MAGNESIUM SERPL-MCNC: 2.5 MG/DL (ref 1.6–2.3)
MCH RBC QN AUTO: 30.3 PG (ref 26.5–33)
MCH RBC QN AUTO: 30.4 PG (ref 26.5–33)
MCH RBC QN AUTO: 30.7 PG (ref 26.5–33)
MCHC RBC AUTO-ENTMCNC: 32.7 G/DL (ref 31.5–36.5)
MCHC RBC AUTO-ENTMCNC: 33 G/DL (ref 31.5–36.5)
MCHC RBC AUTO-ENTMCNC: 33.1 G/DL (ref 31.5–36.5)
MCV RBC AUTO: 92 FL (ref 78–100)
MCV RBC AUTO: 93 FL (ref 78–100)
MCV RBC AUTO: 93 FL (ref 78–100)
O2/TOTAL GAS SETTING VFR VENT: ABNORMAL %
O2/TOTAL GAS SETTING VFR VENT: NORMAL %
OXYHGB MFR BLDV: 72 %
PCO2 BLD: 46 MM HG (ref 35–45)
PCO2 BLDV: 49 MM HG (ref 40–50)
PH BLD: 7.36 PH (ref 7.35–7.45)
PH BLDV: 7.33 PH (ref 7.32–7.43)
PHOSPHATE SERPL-MCNC: 4.2 MG/DL (ref 2.5–4.5)
PLATELET # BLD AUTO: 106 10E9/L (ref 150–450)
PLATELET # BLD AUTO: 127 10E9/L (ref 150–450)
PLATELET # BLD AUTO: 139 10E9/L (ref 150–450)
PO2 BLD: 134 MM HG (ref 80–105)
PO2 BLDV: 40 MM HG (ref 25–47)
POTASSIUM SERPL-SCNC: 4.2 MMOL/L (ref 3.4–5.3)
POTASSIUM SERPL-SCNC: 5.1 MMOL/L (ref 3.4–5.3)
RBC # BLD AUTO: 3.3 10E12/L (ref 4.4–5.9)
RBC # BLD AUTO: 3.81 10E12/L (ref 4.4–5.9)
RBC # BLD AUTO: 3.82 10E12/L (ref 4.4–5.9)
SODIUM SERPL-SCNC: 132 MMOL/L (ref 133–144)
SODIUM SERPL-SCNC: 141 MMOL/L (ref 133–144)
WBC # BLD AUTO: 15.9 10E9/L (ref 4–11)
WBC # BLD AUTO: 16.6 10E9/L (ref 4–11)
WBC # BLD AUTO: 18.5 10E9/L (ref 4–11)

## 2017-11-18 PROCEDURE — 71010 XR CHEST PORT 1 VW: CPT

## 2017-11-18 PROCEDURE — 99291 CRITICAL CARE FIRST HOUR: CPT | Mod: GC | Performed by: ANESTHESIOLOGY

## 2017-11-18 PROCEDURE — 25000128 H RX IP 250 OP 636: Performed by: ANESTHESIOLOGY

## 2017-11-18 PROCEDURE — 83735 ASSAY OF MAGNESIUM: CPT | Performed by: THORACIC SURGERY (CARDIOTHORACIC VASCULAR SURGERY)

## 2017-11-18 PROCEDURE — 40000048 ZZH STATISTIC DAILY SWAN MONITORING

## 2017-11-18 PROCEDURE — 25000131 ZZH RX MED GY IP 250 OP 636 PS 637: Mod: GY | Performed by: THORACIC SURGERY (CARDIOTHORACIC VASCULAR SURGERY)

## 2017-11-18 PROCEDURE — 27210995 ZZH RX 272: Performed by: THORACIC SURGERY (CARDIOTHORACIC VASCULAR SURGERY)

## 2017-11-18 PROCEDURE — 40000275 ZZH STATISTIC RCP TIME EA 10 MIN

## 2017-11-18 PROCEDURE — 40000196 ZZH STATISTIC RAPCV CVP MONITORING

## 2017-11-18 PROCEDURE — 25000128 H RX IP 250 OP 636: Performed by: THORACIC SURGERY (CARDIOTHORACIC VASCULAR SURGERY)

## 2017-11-18 PROCEDURE — 25000128 H RX IP 250 OP 636: Performed by: SURGERY

## 2017-11-18 PROCEDURE — 82330 ASSAY OF CALCIUM: CPT | Performed by: THORACIC SURGERY (CARDIOTHORACIC VASCULAR SURGERY)

## 2017-11-18 PROCEDURE — 84100 ASSAY OF PHOSPHORUS: CPT | Performed by: THORACIC SURGERY (CARDIOTHORACIC VASCULAR SURGERY)

## 2017-11-18 PROCEDURE — 83036 HEMOGLOBIN GLYCOSYLATED A1C: CPT | Performed by: THORACIC SURGERY (CARDIOTHORACIC VASCULAR SURGERY)

## 2017-11-18 PROCEDURE — 97535 SELF CARE MNGMENT TRAINING: CPT | Mod: GO | Performed by: OCCUPATIONAL THERAPIST

## 2017-11-18 PROCEDURE — 25000128 H RX IP 250 OP 636: Performed by: PHYSICIAN ASSISTANT

## 2017-11-18 PROCEDURE — 85027 COMPLETE CBC AUTOMATED: CPT | Performed by: THORACIC SURGERY (CARDIOTHORACIC VASCULAR SURGERY)

## 2017-11-18 PROCEDURE — 85730 THROMBOPLASTIN TIME PARTIAL: CPT | Performed by: THORACIC SURGERY (CARDIOTHORACIC VASCULAR SURGERY)

## 2017-11-18 PROCEDURE — A9270 NON-COVERED ITEM OR SERVICE: HCPCS | Mod: GY | Performed by: ANESTHESIOLOGY

## 2017-11-18 PROCEDURE — 21400006 ZZH R&B CCU INTERMEDIATE UMMC

## 2017-11-18 PROCEDURE — 93005 ELECTROCARDIOGRAM TRACING: CPT

## 2017-11-18 PROCEDURE — 00000146 ZZHCL STATISTIC GLUCOSE BY METER IP

## 2017-11-18 PROCEDURE — A9270 NON-COVERED ITEM OR SERVICE: HCPCS | Mod: GY | Performed by: SURGERY

## 2017-11-18 PROCEDURE — 85610 PROTHROMBIN TIME: CPT | Performed by: THORACIC SURGERY (CARDIOTHORACIC VASCULAR SURGERY)

## 2017-11-18 PROCEDURE — 36415 COLL VENOUS BLD VENIPUNCTURE: CPT | Performed by: THORACIC SURGERY (CARDIOTHORACIC VASCULAR SURGERY)

## 2017-11-18 PROCEDURE — 25000132 ZZH RX MED GY IP 250 OP 250 PS 637: Mod: GY | Performed by: SURGERY

## 2017-11-18 PROCEDURE — 40000133 ZZH STATISTIC OT WARD VISIT: Performed by: OCCUPATIONAL THERAPIST

## 2017-11-18 PROCEDURE — 93010 ELECTROCARDIOGRAM REPORT: CPT | Mod: 76 | Performed by: INTERNAL MEDICINE

## 2017-11-18 PROCEDURE — 97165 OT EVAL LOW COMPLEX 30 MIN: CPT | Mod: GO | Performed by: OCCUPATIONAL THERAPIST

## 2017-11-18 PROCEDURE — A9270 NON-COVERED ITEM OR SERVICE: HCPCS | Mod: GY | Performed by: THORACIC SURGERY (CARDIOTHORACIC VASCULAR SURGERY)

## 2017-11-18 PROCEDURE — 80048 BASIC METABOLIC PNL TOTAL CA: CPT | Performed by: THORACIC SURGERY (CARDIOTHORACIC VASCULAR SURGERY)

## 2017-11-18 PROCEDURE — 25000132 ZZH RX MED GY IP 250 OP 250 PS 637: Mod: GY | Performed by: ANESTHESIOLOGY

## 2017-11-18 PROCEDURE — 40000014 ZZH STATISTIC ARTERIAL MONITORING DAILY

## 2017-11-18 PROCEDURE — P9041 ALBUMIN (HUMAN),5%, 50ML: HCPCS | Performed by: ANESTHESIOLOGY

## 2017-11-18 PROCEDURE — 71010 XR CHEST PORT 1 VW: CPT | Mod: 77

## 2017-11-18 PROCEDURE — 25000132 ZZH RX MED GY IP 250 OP 250 PS 637: Mod: GY | Performed by: THORACIC SURGERY (CARDIOTHORACIC VASCULAR SURGERY)

## 2017-11-18 RX ORDER — PHENYTOIN SODIUM 100 MG/1
400 CAPSULE, EXTENDED RELEASE ORAL AT BEDTIME
Status: DISCONTINUED | OUTPATIENT
Start: 2017-11-18 | End: 2017-11-22 | Stop reason: HOSPADM

## 2017-11-18 RX ORDER — HEPARIN SODIUM 5000 [USP'U]/.5ML
5000 INJECTION, SOLUTION INTRAVENOUS; SUBCUTANEOUS EVERY 8 HOURS
Status: DISCONTINUED | OUTPATIENT
Start: 2017-11-18 | End: 2017-11-22 | Stop reason: HOSPADM

## 2017-11-18 RX ORDER — DEXTROSE MONOHYDRATE 25 G/50ML
25-50 INJECTION, SOLUTION INTRAVENOUS
Status: DISCONTINUED | OUTPATIENT
Start: 2017-11-18 | End: 2017-11-22 | Stop reason: HOSPADM

## 2017-11-18 RX ORDER — ASPIRIN 81 MG/1
81 TABLET, CHEWABLE ORAL DAILY
Status: DISCONTINUED | OUTPATIENT
Start: 2017-11-18 | End: 2017-11-18

## 2017-11-18 RX ORDER — OXYCODONE HYDROCHLORIDE 5 MG/1
10-15 TABLET ORAL EVERY 4 HOURS PRN
Status: DISCONTINUED | OUTPATIENT
Start: 2017-11-18 | End: 2017-11-22 | Stop reason: HOSPADM

## 2017-11-18 RX ORDER — NICOTINE POLACRILEX 4 MG
15-30 LOZENGE BUCCAL
Status: DISCONTINUED | OUTPATIENT
Start: 2017-11-18 | End: 2017-11-18

## 2017-11-18 RX ORDER — ALBUMIN, HUMAN INJ 5% 5 %
12.5 SOLUTION INTRAVENOUS ONCE
Status: COMPLETED | OUTPATIENT
Start: 2017-11-18 | End: 2017-11-18

## 2017-11-18 RX ORDER — FINASTERIDE 5 MG/1
5 TABLET, FILM COATED ORAL DAILY
Status: DISCONTINUED | OUTPATIENT
Start: 2017-11-18 | End: 2017-11-22 | Stop reason: HOSPADM

## 2017-11-18 RX ORDER — ATORVASTATIN CALCIUM 80 MG/1
80 TABLET, FILM COATED ORAL
Status: DISCONTINUED | OUTPATIENT
Start: 2017-11-18 | End: 2017-11-22 | Stop reason: HOSPADM

## 2017-11-18 RX ORDER — HYDROMORPHONE HYDROCHLORIDE 1 MG/ML
0.5 INJECTION, SOLUTION INTRAMUSCULAR; INTRAVENOUS; SUBCUTANEOUS
Status: COMPLETED | OUTPATIENT
Start: 2017-11-18 | End: 2017-11-18

## 2017-11-18 RX ORDER — DEXTROSE MONOHYDRATE 25 G/50ML
25-50 INJECTION, SOLUTION INTRAVENOUS
Status: DISCONTINUED | OUTPATIENT
Start: 2017-11-18 | End: 2017-11-18

## 2017-11-18 RX ORDER — NICOTINE POLACRILEX 4 MG
15-30 LOZENGE BUCCAL
Status: DISCONTINUED | OUTPATIENT
Start: 2017-11-18 | End: 2017-11-22 | Stop reason: HOSPADM

## 2017-11-18 RX ORDER — FUROSEMIDE 10 MG/ML
20 INJECTION INTRAMUSCULAR; INTRAVENOUS ONCE
Status: COMPLETED | OUTPATIENT
Start: 2017-11-18 | End: 2017-11-18

## 2017-11-18 RX ORDER — TAMSULOSIN HYDROCHLORIDE 0.4 MG/1
0.4 CAPSULE ORAL AT BEDTIME
Status: DISCONTINUED | OUTPATIENT
Start: 2017-11-18 | End: 2017-11-22 | Stop reason: HOSPADM

## 2017-11-18 RX ORDER — FINASTERIDE 5 MG/1
1 TABLET, FILM COATED ORAL DAILY
Status: DISCONTINUED | OUTPATIENT
Start: 2017-11-18 | End: 2017-11-18

## 2017-11-18 RX ORDER — ALBUMIN, HUMAN INJ 5% 5 %
SOLUTION INTRAVENOUS
Status: DISCONTINUED
Start: 2017-11-18 | End: 2017-11-18 | Stop reason: HOSPADM

## 2017-11-18 RX ADMIN — OXYCODONE HYDROCHLORIDE 5 MG: 5 TABLET ORAL at 12:10

## 2017-11-18 RX ADMIN — INSULIN ASPART 1 UNITS: 100 INJECTION, SOLUTION INTRAVENOUS; SUBCUTANEOUS at 18:31

## 2017-11-18 RX ADMIN — OXYCODONE HYDROCHLORIDE 15 MG: 5 TABLET ORAL at 21:50

## 2017-11-18 RX ADMIN — HYDROMORPHONE HYDROCHLORIDE 0.5 MG: 1 INJECTION, SOLUTION INTRAMUSCULAR; INTRAVENOUS; SUBCUTANEOUS at 01:09

## 2017-11-18 RX ADMIN — HYDROMORPHONE HYDROCHLORIDE 0.5 MG: 1 INJECTION, SOLUTION INTRAMUSCULAR; INTRAVENOUS; SUBCUTANEOUS at 19:56

## 2017-11-18 RX ADMIN — ACETAMINOPHEN 975 MG: 325 TABLET, FILM COATED ORAL at 03:12

## 2017-11-18 RX ADMIN — HYDROMORPHONE HYDROCHLORIDE 0.5 MG: 1 INJECTION, SOLUTION INTRAMUSCULAR; INTRAVENOUS; SUBCUTANEOUS at 04:06

## 2017-11-18 RX ADMIN — FUROSEMIDE 20 MG: 10 INJECTION, SOLUTION INTRAVENOUS at 16:43

## 2017-11-18 RX ADMIN — CEFAZOLIN SODIUM 1 G: 10 INJECTION, POWDER, FOR SOLUTION INTRAVENOUS at 01:21

## 2017-11-18 RX ADMIN — ACETAMINOPHEN 975 MG: 325 TABLET, FILM COATED ORAL at 10:58

## 2017-11-18 RX ADMIN — OXYCODONE HYDROCHLORIDE 10 MG: 5 TABLET ORAL at 03:33

## 2017-11-18 RX ADMIN — SODIUM CHLORIDE, POTASSIUM CHLORIDE, SODIUM LACTATE AND CALCIUM CHLORIDE 500 ML: 600; 310; 30; 20 INJECTION, SOLUTION INTRAVENOUS at 12:48

## 2017-11-18 RX ADMIN — HYDROMORPHONE HYDROCHLORIDE 0.5 MG: 1 INJECTION, SOLUTION INTRAMUSCULAR; INTRAVENOUS; SUBCUTANEOUS at 16:41

## 2017-11-18 RX ADMIN — ONDANSETRON 4 MG: 2 INJECTION INTRAMUSCULAR; INTRAVENOUS at 03:57

## 2017-11-18 RX ADMIN — HEPARIN SODIUM 5000 UNITS: 5000 INJECTION, SOLUTION INTRAVENOUS; SUBCUTANEOUS at 16:47

## 2017-11-18 RX ADMIN — MUPIROCIN 0.5 G: 20 OINTMENT TOPICAL at 08:49

## 2017-11-18 RX ADMIN — HYDROMORPHONE HYDROCHLORIDE 0.5 MG: 1 INJECTION, SOLUTION INTRAMUSCULAR; INTRAVENOUS; SUBCUTANEOUS at 08:59

## 2017-11-18 RX ADMIN — ALBUMIN HUMAN 12.5 G: 0.05 INJECTION, SOLUTION INTRAVENOUS at 09:26

## 2017-11-18 RX ADMIN — CEFAZOLIN SODIUM 1 G: 10 INJECTION, POWDER, FOR SOLUTION INTRAVENOUS at 08:10

## 2017-11-18 RX ADMIN — DOCUSATE SODIUM 100 MG: 100 CAPSULE, LIQUID FILLED ORAL at 08:09

## 2017-11-18 RX ADMIN — PHENYTOIN SODIUM 400 MG: 100 CAPSULE ORAL at 21:50

## 2017-11-18 RX ADMIN — OXYCODONE HYDROCHLORIDE 10 MG: 5 TABLET ORAL at 08:09

## 2017-11-18 RX ADMIN — DOCUSATE SODIUM 100 MG: 100 CAPSULE, LIQUID FILLED ORAL at 19:56

## 2017-11-18 RX ADMIN — METOPROLOL TARTRATE 12.5 MG: 25 TABLET, FILM COATED ORAL at 08:09

## 2017-11-18 RX ADMIN — METOPROLOL TARTRATE 12.5 MG: 25 TABLET, FILM COATED ORAL at 19:56

## 2017-11-18 RX ADMIN — OMEPRAZOLE 40 MG: 20 CAPSULE, DELAYED RELEASE ORAL at 08:09

## 2017-11-18 RX ADMIN — HYDROMORPHONE HYDROCHLORIDE 0.5 MG: 1 INJECTION, SOLUTION INTRAMUSCULAR; INTRAVENOUS; SUBCUTANEOUS at 14:20

## 2017-11-18 RX ADMIN — HYDROMORPHONE HYDROCHLORIDE 0.5 MG: 1 INJECTION, SOLUTION INTRAMUSCULAR; INTRAVENOUS; SUBCUTANEOUS at 21:40

## 2017-11-18 RX ADMIN — ATORVASTATIN CALCIUM 80 MG: 80 TABLET, FILM COATED ORAL at 19:56

## 2017-11-18 RX ADMIN — TAMSULOSIN HYDROCHLORIDE 0.4 MG: 0.4 CAPSULE ORAL at 21:50

## 2017-11-18 RX ADMIN — OXYCODONE HYDROCHLORIDE 5 MG: 5 TABLET ORAL at 18:46

## 2017-11-18 RX ADMIN — FINASTERIDE 5 MG: 5 TABLET, FILM COATED ORAL at 08:10

## 2017-11-18 RX ADMIN — HEPARIN SODIUM 5000 UNITS: 5000 INJECTION, SOLUTION INTRAVENOUS; SUBCUTANEOUS at 23:50

## 2017-11-18 RX ADMIN — ASPIRIN 325 MG ORAL TABLET 325 MG: 325 PILL ORAL at 08:09

## 2017-11-18 RX ADMIN — ACETAMINOPHEN 975 MG: 325 TABLET, FILM COATED ORAL at 18:29

## 2017-11-18 RX ADMIN — OXYCODONE HYDROCHLORIDE 5 MG: 5 TABLET ORAL at 20:34

## 2017-11-18 RX ADMIN — HEPARIN SODIUM 5000 UNITS: 5000 INJECTION, SOLUTION INTRAVENOUS; SUBCUTANEOUS at 08:09

## 2017-11-18 RX ADMIN — MUPIROCIN 0.5 G: 20 OINTMENT TOPICAL at 19:56

## 2017-11-18 RX ADMIN — HYDROMORPHONE HYDROCHLORIDE 0.5 MG: 1 INJECTION, SOLUTION INTRAMUSCULAR; INTRAVENOUS; SUBCUTANEOUS at 23:50

## 2017-11-18 RX ADMIN — CEFAZOLIN SODIUM 1 G: 10 INJECTION, POWDER, FOR SOLUTION INTRAVENOUS at 18:29

## 2017-11-18 ASSESSMENT — PAIN DESCRIPTION - DESCRIPTORS
DESCRIPTORS: ACHING
DESCRIPTORS: SORE
DESCRIPTORS: ACHING;DULL;SHARP
DESCRIPTORS: ACHING

## 2017-11-18 ASSESSMENT — ACTIVITIES OF DAILY LIVING (ADL): IADL_COMMENTS: FAMILY ABLE TO ASSIST WITH IADLS PRN

## 2017-11-18 NOTE — PLAN OF CARE
Problem: Patient Care Overview  Goal: Plan of Care/Patient Progress Review  OT/CR 4E: Evaluation complete and treatment initiated.  Pt receptive to education on post surgical precautions.  Initiated active mobility post op - min A supine to EOB and CGA x 1-2 sit to stand and transfer to bedside chair; however, extra time needed between positional changes as pt became hypotensive and diaphoretic with BP 80s/50s and MAP 58, HR tachy to 122 after standing.  Pt resting comfortably in bedside chair at end of session.  RN monitoring.  Unable to further progress pt activity today though will continue to follow and advance as tolerated.    Discharge Planner OT   Patient plan for discharge: home  Current status: see above for details  Barriers to return to prior living situation: acute medical needs; post surgical precautions  Recommendations for discharge: Anticipate pt will be able to discharge to home with assist prn and OP PHase II CR  Rationale for recommendations: Pt demonstrates good functional strength though is limited by hypotension and pain today; anticipate pt will progress well with therapy and be able to safely discharge to home.       Entered by: Marion Burkett 11/18/2017 4:04 PM

## 2017-11-18 NOTE — PROGRESS NOTES
CV ICU Progress Note    POD: 1 Day Post-Op  LOS: 3    Admission History: Luis Eduardo Oliver is a 77 year old male admitted to the ICU for postoperative management following a 2 vessel CABG by Dr Sanchez on 17.  He also has a PMH of Unstable angina 2/2 severe left main disease s/p IABP s/p CABG, Seizure disorder, BPH, and Rob's esophagus.      S/Interval History:  Progressed well overnight.  Extubated last evening.  Off all pressors.  No issues.    Transfer to floor today.      O:    Temp: 100.4  F (38  C) Temp  Min: 37.4  F (3  C)  Max: 100.9  F (38.3  C)  Resp: 20 Resp  Min: 8  Max: 22  SpO2: 100 % SpO2  Min: 87 %  Max: 100 %    No Data Recorded  Heart Rate: 101 Heart Rate  Min: 61  Max: 119  BP: 128/57 Systolic (24hrs), Av , Min:123 , Max:137   Diastolic (24hrs), Av, Min:57, Max:72    Ventilation Mode: CMV/AC  Rate Set (breaths/minute): 12 breaths/min  Tidal Volume Set (mL): 500 mL  PEEP (cm H2O): 5 cmH2O  Oxygen Concentration (%): 50 %  Resp: 20  Date 17 0700 - 17 0659   Shift 7964-5091 6297-5926 3345-1938 24 Hour Total   I  N  T  A  K  E   I.V. 9   9    Shift Total  (mL/kg) 9  (0.11)   9  (0.11)   O  U  T  P  U  T   Shift Total  (mL/kg)       Weight (kg) 81.8 81.8 81.8 81.8         Past Medical History:   Diagnosis Date     BPH (benign prostatic hyperplasia)      Cholelithiasis      Seizure disorder (H)        Past Surgical History:   Procedure Laterality Date     APPENDECTOMY       CHOLECYSTECTOMY       COLONOSCOPY  2017     UPPER GI ENDOSCOPY  2017     VASECTOMY         Physical Exam    General: Sitting up in bed, breathing room air, no distress.   Neck: Supple, no tracheal deviation  Cardiovascular: RRR  Pumonary: Non labored breathing.  CTAB   Abdomen: Soft, non distended, non tender.   Ext: W/o edema, wwp  Neuro: Moves all extremities.  Communicates in full conversation.  No deficits noted.      Lab Results   Component Value Date    WBC 16.6 (H) 2017    HGB 11.6  (L) 11/18/2017    HCT 35.5 (L) 11/18/2017     (L) 11/18/2017     11/18/2017    POTASSIUM 5.1 11/18/2017    CHLORIDE 110 (H) 11/18/2017    CO2 22 11/18/2017    BUN 15 11/18/2017    CR 0.82 11/18/2017     (H) 11/18/2017    TROPI 0.017 11/15/2017    AST 18 11/16/2017    ALT 37 11/16/2017    ALKPHOS 108 11/16/2017    BILITOTAL 0.2 11/16/2017    INR 1.42 (H) 11/18/2017         IV fluid REPLACEMENT ONLY       insulin (regular) Stopped (11/17/17 1919)     dextrose 5% and 0.45% NaCl + KCl 20 mEq/L 10 mL/hr at 11/18/17 0700     EPINEPHrine IV infusion ADULT Stopped (11/17/17 1850)     phenylephrine IV infusion ADULT Stopped (11/17/17 1920)     nitroGLYcerin Stopped (11/17/17 1920)     propofol (DIPRIVAN) infusion Stopped (11/17/17 2200)         Assessment and Plan: Luis Eduardo Oliver is a 77 year old male admitted to the ICU for postoperative management following a 2 vessel CABG by Dr Sanchez on 11/17/17.  He also has a PMH of Unstable angina 2/2 severe left main disease s/p IABP s/p CABG, Seizure disorder, BPH, and Rob's esophagus.      Neuro:     Seizure disorder    Restarting home Phenytoin    Patient did not receive evening dose last night, but has been consistent prior.    Pain    Scheduled Tylenol    PRN Oxy & Dilaudid  Resp:     Extubated last evening.    Tolerating NC well.    Continue to monitor  CV:     CAD 2/2 severe left main disease 2/p 2 vessel CABG (11/7/17)    Stable    Off all pressors    Monitor hemodynamics    Restart home Metoprolol, Statin, ASA    Transfer to floor today  GI/FEN:     Advancing cardiac diet    Rob's esophagus    Restarted home Omeprazole  Renal:     Monitor electrolytes, Cr, and urine output     Lytes goals K>4 and Mag>2    Strict I/O's    BPH:    Restarting home Finasteride & Tamsulosin  Endo:    q4h glucose checks, SSI to serum glucose >80, <180 mg/dL  ID:     Perioperative antibiotics    Monitor WBC daily  Heme:     Hgb stable    Continue to monitor    DVT  prophylaxis    Heparin 5000 U SQ q 8 hours  Prophylaxis: SCDs, PPI, SQ heparin, ASA  Lines:   PIV x2     CVC  1 Day Post-Op    [place] 1 Day Post-Op     D/C lines      Dispo: TTF  Code Status: Full Code      Eddy Pablo, DO  Anesthesiology Resident  CVICU Service, *[phone]    Patient seen and staffed with attending.

## 2017-11-18 NOTE — PLAN OF CARE
Problem: Patient Care Overview  Goal: Plan of Care/Patient Progress Review  Outcome: Improving  Patient alert and oriented.  Currently on room air. Using the IS; achieved 1,000.  SR with occasional PACs and PVCs. BP 90s/50s.  Patient got hypotensive (MAP 58), dizzy and diaphoretic the first time he stood up this morning.  MD notified and 250ml albumin ordered.  UOP low. MD notified and aware. MD ordered another fluid bolus of 500ml LR.  Bladder scanned patient and flushed hennessy to trouble-shoot however hennessy flushed well and almost no urine found on bladder scan. Hennessy in place for now until UOP picks back up (and per patient request). Will continue to closely monitor.  Temp pacer wires capped. No appetite yet but patient reported that he is passing gas. Encouraging PO fluids as tolerated. Total chest tube output 20-40ml/hr. Silas and art line d/c'ed. Plan to transfer to floor 6C.

## 2017-11-18 NOTE — ANESTHESIA CARE TRANSFER NOTE
Patient: Luis Eduardo Oliver    Procedure(s):  Median Sternotomy, Coronary Artery Bypass Graft x 2 using Left Internal Mammary and Left Endoscopic Greater Saphenous Vein Happy     on pump oxygenator - Wound Class: I-Clean    Diagnosis: Coronary Artery Disease   Diagnosis Additional Information: No value filed.    Anesthesia Type:   General, RSI, ETT     Note:  Airway :ETT  Patient transferred to:ICU  Comments: To 4E intubated and sedated, monitored, VSS on arrival (see Epic), placed on vent per RT, gtts reviewed with RN, report given, care accepted.ICU Handoff: Call for PAUSE to initiate/utilize ICU HANDOFF, Identified Patient, Identified Responsible Provider, Reviewed the Pertinent Medical History, Discussed Surgical Course, Reviewed Intra-OP Anesthesia Management and Issues during Anesthesia, Set Expectations for Post Procedure Period and Allowed Opportunity for Questions and Acknowledgement of Understanding      Vitals: (Last set prior to Anesthesia Care Transfer)    CRNA VITALS  11/17/2017 1710 - 11/17/2017 1800      11/17/2017             ART BP: 127/47    Ht Rate: 74                Electronically Signed By: JEYSON Vigil CRNA  November 17, 2017  6:00 PM

## 2017-11-18 NOTE — PROGRESS NOTES
CV ICU Progress Note    POD: 1 Day Post-Op CABGx2  LOS: 3      S/Interval History:  Progressed well overnight.  Extubated last evening.  Off all pressors.  No issues.    Transfer to floor today.      O:    Temp: 98  F (36.7  C) Temp  Min: 37.4  F (3  C)  Max: 100.9  F (38.3  C)  Resp: 16 Resp  Min: 14  Max: 20  SpO2: 99 % SpO2  Min: 96 %  Max: 100 %    No Data Recorded  Heart Rate: 88 Heart Rate  Min: 73  Max: 122  BP: 100/54 Systolic (24hrs), Av , Min:80 , Max:114   Diastolic (24hrs), Av, Min:37, Max:68    Ventilation Mode: CMV/AC  Rate Set (breaths/minute): 12 breaths/min  Tidal Volume Set (mL): 500 mL  PEEP (cm H2O): 5 cmH2O  Oxygen Concentration (%): 50 %  Resp: 16  Date 17 0700 - 17 0659   Shift 9651-7418 6432-6808 4886-4224 24 Hour Total   I  N  T  A  K  E   I.V. 9   9    Shift Total  (mL/kg) 9  (0.11)   9  (0.11)   O  U  T  P  U  T   Shift Total  (mL/kg)       Weight (kg) 81.8 81.8 81.8 81.8         Past Medical History:   Diagnosis Date     BPH (benign prostatic hyperplasia)      Cholelithiasis      Seizure disorder (H)        Past Surgical History:   Procedure Laterality Date     APPENDECTOMY       CHOLECYSTECTOMY       COLONOSCOPY  2017     UPPER GI ENDOSCOPY  2017     VASECTOMY         Physical Exam    - General: Sitting up in bed, breathing room air, no distress.   - Neck: Supple, no tracheal deviation  - Cardiovascular: RRR  - Pumonary: Non labored breathing.  CTAB   - Abdomen: Soft, non distended, non tender.   - Ext: W/o edema, wwp  - Neuro: Moves all extremities.  Communicates in full conversation.  No deficits noted.      Lab Results   Component Value Date    WBC 16.6 (H) 2017    HGB 11.6 (L) 2017    HCT 35.5 (L) 2017     (L) 2017     2017    POTASSIUM 5.1 2017    CHLORIDE 110 (H) 2017    CO2 22 2017    BUN 15 2017    CR 0.82 2017     (H) 2017    TROPI 0.017 11/15/2017    AST 18  11/16/2017    ALT 37 11/16/2017    ALKPHOS 108 11/16/2017    BILITOTAL 0.2 11/16/2017    INR 1.42 (H) 11/18/2017         IV fluid REPLACEMENT ONLY           Assessment and Plan:   - Luis Eduardo Oliver is a 77 year old male admitted to the ICU for postoperative management following a 2 vessel CABG by Dr Sanchez on 11/17/17.    - He also has a PMH of Unstable angina 2/2 severe left main disease s/p IABP s/p CABG, Seizure disorder, BPH, and Rob's esophagus.      Neuro:   - Seizure disorder  - Restarting home Phenytoin  - Patient did not receive evening dose last night, but has been consistent prior.    Pain:  - Scheduled Tylenol  - PRN Oxy & Dilaudid    Resp:   - Extubated last evening.  - Tolerating NC well.  - Continue to monitor    CV:   - CAD 2/2 severe left main disease 2/p 2 vessel CABG (11/7/17)  - stable  - Off all pressors  - Monitor hemodynamics  - Restart home Metoprolol, Statin, ASA   - Transfer to floor today    GI/FEN:   - Advancing cardiac diet  - Rob's esophagus  - Restarted home Omeprazole    Renal:   - Monitor electrolytes, Cr, and urine output   - Lytes goals K>4 and Mag>2  - Strict I/O's    BPH:  - Restarting home Finasteride & Tamsulosin    Endo:  -q4h glucose checks, SSI to serum glucose >80, <180 mg/dL    ID:   - Perioperative antibiotics  - Monitor WBC daily    Heme:   - Hgb stable  - Continue to monitor  - DVT prophylaxis  - Heparin 5000 U SQ q 8 hours  - Prophylaxis: SCDs, PPI, SQ heparin, ASA    Discussed with CVTS Dr. Sanchez.   Dr. Hodge (Staff surgeon oncall)  Rounded on patient and has been informed of changes.    Mahendra Reyna  [facility] Transplant Fellow  [phone]

## 2017-11-18 NOTE — PROGRESS NOTES
[facility], [place]  Procedure Note          Extubation:       Luis Eduardo Oliver  MRN# [id number]   November 17, 2017, 10:29 PM         Patient extubated at: November 17, 2017, 10:29 PM   Supplemental Oxygen: Via nasal cannula at 4 liters per minute   Cough: The cough is good   Secretion Mode: Able to clear   Secretion Amount: Large amount, thick and white / yellow in color   Respiratory Exam:: Breath sounds: good aeration     Location: bilaterally   Skin Exam:: Patient color: natural   Patient Status: Currently appears comfortable   Arterial Blood Gasses: pH Arterial (pH)   Date Value   11/17/2017 7.39     pO2 Arterial (mm Hg)   Date Value   11/17/2017 120 (H)     pCO2 Arterial (mm Hg)   Date Value   11/17/2017 38     Bicarbonate Arterial (mmol/L)   Date Value   11/17/2017 23              Recorded by Michelle Smart

## 2017-11-18 NOTE — PROGRESS NOTES
"CLINICAL NUTRITION SERVICES    Reason for Assessment:  Heart-healthy nutrition education, received consult.    Diet History:  Pt reports no history of receiving heart-healthy nutrition education in the past.  Pt is interested in making changes and reports current diet is \"not good\".     Nutrition Diagnosis:  Food- and nutrition-related knowledge deficit r/t no previous knowledge of heart-healthy diet AEB pt report of no previous formal heart-healthy nutrition education.    Nutrition Prescription/Recs:  Continue heart-healthy diet.  Provide education at a later date (with wife present if able) per pt request.    Interventions:  Nutrition Education  Provided handouts at bedside:  Heart Health Guidelines (includes Heart Healthy Food Choices) and list of heart healthy foods.  Will attempt education again prior to d/c as able     Goals:    TBD    Follow-up:   Patient to ask any further nutrition-related questions before discharge.  In addition, pt may request outpatient RD appointment.      Chi Kay RD, LD  Pager: [phone]    "

## 2017-11-18 NOTE — PLAN OF CARE
Problem: Cardiac Surgery (Adult)  Intervention: Monitor/Manage Cardiac Rhythm Disturbance  D;Pt returned from O.R about 1800 yesterday.Intubated  And ventilated.Waking up about 2000.Moves  All extremities and follow commands.Stopped epi gtt only pressor was on.Stopped in 1hr.Extubated at 2220 without any complications.4L NC.C/O incisional pain with nausea.Using IS 600ml.See ABG's SVO2 70's.CVP 6.L leg ace wrap  Dry with good pedal pulse.Chest incision dressing dry.Pacer wires patent..  I;A;Stable post op  And post op pain.  P;Continue to monitor cardiac status with pulmonary toilets provided.

## 2017-11-18 NOTE — ANESTHESIA POSTPROCEDURE EVALUATION
Patient: Luis Eduardo Oliver    Procedure(s):  Median Sternotomy, Coronary Artery Bypass Graft x 2 using Left Internal Mammary and Left Endoscopic Greater Saphenous Vein [place]     on pump oxygenator - Wound Class: I-Clean    Diagnosis:Coronary Artery Disease   Diagnosis Additional Information: No value filed.    Anesthesia Type:  General, RSI, ETT    Note:  Anesthesia Post Evaluation    Patient location during evaluation: ICU  Patient participation: Unable to evaluate secondary to administered sedation  Level of consciousness: obtunded/minimal responses  Pain management: adequate  Airway patency: patent  Cardiovascular status: acceptable  Respiratory status: acceptable  Hydration status: acceptable  PONV: none             Last vitals:  Vitals:    11/17/17 1100 11/17/17 1115 11/17/17 1130   BP: 128/57     Resp: 10 10 21   Temp:      SpO2: 95% 95% 96%         Electronically Signed By: Jordan Diaz MD  November 17, 2017  6:04 PM

## 2017-11-18 NOTE — PROGRESS NOTES
" 11/18/17 0900   Quick Adds   Type of Visit Initial Occupational Therapy Evaluation   Living Environment   Lives With spouse   Living Arrangements house   Home Accessibility bed and bath are not on the first floor;stairs within home;tub/shower is not walk in   Number of Stairs to Enter Home 0   Number of Stairs Within Home 20  (2 flights up from garage to living spaces)   Stair Railings at Home (yes)   Transportation Available car;family or friend will provide   Living Environment Comment [place], no stairs to enter, 2 flights up from garage entry to living spaces   Self-Care   Usual Activity Tolerance good   Current Activity Tolerance fair   Equipment Currently Used at Home none   Activity/Exercise/Self-Care Comment Pt was previously independent with all ADL/IADLs; reports being very active, walking, gardening, outdoor activity until approx 1-2 months ago when he began to experience increased SOB and angina which has limited his activity recently   Functional Level Prior   Ambulation 0-->independent   Transferring 0-->independent   Toileting 0-->independent   Bathing 0-->independent   Dressing 0-->independent   Eating 0-->independent   Cognition 0 - no cognition issues reported   Fall history within last six months no   Which of the above functional risks had a recent onset or change? none       Present no   Language english   General Information   Onset of Illness/Injury or Date of Surgery - Date 11/15/17   Referring Physician Rod Sanchez MD   Patient/Family Goals Statement Return to home and prior activity   Additional Occupational Profile Info/Pertinent History of Current Problem Luis Eduardo Oliver is a 77 year old male admitted to the ICU for postoperative management following a 2 vessel CABG by Dr Sanchez on 11/17/17.  He also has a PMH of Unstable angina 2/2 severe left main disease s/p IABP s/p CABG, Seizure disorder, BPH, and " Rob's esophagus   Precautions/Limitations sternal precautions   Heart Disease Risk Factors Medical history;Gender;Age   General Observations Pt pleasant and agreeable; limited by orthostatics and pain   General Info Comments Activity: advance activity as tolerated   Cognitive Status Examination   Orientation orientation to person, place and time   Level of Consciousness alert   Able to Follow Commands WNL/WFL   Personal Safety (Cognitive) WNL/WFL   Memory intact   Attention No deficits were identified   Cognitive Comment no acute cognitive concerns noted   Visual Perception   Visual Perception Comments no acute visual changes noted   Sensory Examination   Sensory Comments intact to light touch on all extremities   Pain Assessment   Patient Currently in Pain (yes- incisional pain)   Range of Motion (ROM)   ROM Comment BUE AROM limited pain post surgically   Strength   Strength Comments not formally assessed 2/2 post surgical precautions; pt demonstrates good functional strength with bedside activity   Mobility   Bed Mobility Bed mobility skill: Supine to sit   Bed Mobility Skill: Supine to Sit   Level of [place]: Supine/Sit minimum assist (75% patients effort)   Physical Assist/Nonphysical Assist: Supine/Sit 1 person + 1 person to manage equipment   Transfer Skill: Bed to Chair/Chair to Bed   Level of [place]: Bed to Chair contact guard   Physical Assist/Nonphysical Assist: Bed to Chair 2 persons   Transfer Skill: Sit to Stand   Level of [place]: Sit/Stand contact guard   Physical Assist/Nonphysical Assist: Sit/Stand 2 persons   Balance   Balance Comments no LOB though pt lightheaded and diaphoretic 2/2 orthostatic hypotension with standing BP 80s/50s and MAP 58 and HR tachy to 120s   Lower Body Dressing   Level of [place]: Dress Lower Body moderate assist (50% patients effort)   Instrumental Activities of Daily Living (IADL)   IADL Comments family able to assist with IADLs prn   Activities of  Daily Living Analysis   Impairments Contributing to Impaired Activities of Daily Living pain;post surgical precautions;strength decreased   General Therapy Interventions   Planned Therapy Interventions ADL retraining;IADL retraining;bed mobility training;ROM;strengthening;transfer training;home program guidelines;progressive activity/exercise;risk factor education   Clinical Impression   Criteria for Skilled Therapeutic Interventions Met yes, treatment indicated   OT Diagnosis decreased activity tolerance and independence with ADLs   Influenced by the following impairments fatigue, pain, post surgical precautions, acute medical needs, deconditioning   Assessment of Occupational Performance 5 or more Performance Deficits   Identified Performance Deficits bed mobility, toileting, dressing, bathing, functional mobility, yardwork   Clinical Decision Making (Complexity) Low complexity   Therapy Frequency daily   Predicted Duration of Therapy Intervention (days/wks) 11/24/17   Anticipated Discharge Disposition Home with Assist;Home with Outpatient Therapy   Risks and Benefits of Treatment have been explained. Yes   Patient, Family & other staff in agreement with plan of care Yes   Total Evaluation Time   Total Evaluation Time (Minutes) 5

## 2017-11-19 ENCOUNTER — APPOINTMENT (OUTPATIENT)
Dept: GENERAL RADIOLOGY | Facility: CLINIC | Age: 77
DRG: 236 | End: 2017-11-19
Attending: PHYSICIAN ASSISTANT
Payer: MEDICARE

## 2017-11-19 LAB
ANION GAP SERPL CALCULATED.3IONS-SCNC: 6 MMOL/L (ref 3–14)
BACTERIA SPEC CULT: NORMAL
BUN SERPL-MCNC: 23 MG/DL (ref 7–30)
CALCIUM SERPL-MCNC: 8.2 MG/DL (ref 8.5–10.1)
CHLORIDE SERPL-SCNC: 101 MMOL/L (ref 94–109)
CO2 SERPL-SCNC: 28 MMOL/L (ref 20–32)
CREAT SERPL-MCNC: 0.94 MG/DL (ref 0.66–1.25)
ERYTHROCYTE [DISTWIDTH] IN BLOOD BY AUTOMATED COUNT: 13.3 % (ref 10–15)
GFR SERPL CREATININE-BSD FRML MDRD: 77 ML/MIN/1.7M2
GLUCOSE BLDC GLUCOMTR-MCNC: 116 MG/DL (ref 70–99)
GLUCOSE BLDC GLUCOMTR-MCNC: 129 MG/DL (ref 70–99)
GLUCOSE BLDC GLUCOMTR-MCNC: 144 MG/DL (ref 70–99)
GLUCOSE BLDC GLUCOMTR-MCNC: 153 MG/DL (ref 70–99)
GLUCOSE BLDC GLUCOMTR-MCNC: 97 MG/DL (ref 70–99)
GLUCOSE SERPL-MCNC: 116 MG/DL (ref 70–99)
HCT VFR BLD AUTO: 28.7 % (ref 40–53)
HGB BLD-MCNC: 9.4 G/DL (ref 13.3–17.7)
MAGNESIUM SERPL-MCNC: 2.3 MG/DL (ref 1.6–2.3)
MCH RBC QN AUTO: 30 PG (ref 26.5–33)
MCHC RBC AUTO-ENTMCNC: 32.8 G/DL (ref 31.5–36.5)
MCV RBC AUTO: 92 FL (ref 78–100)
PLATELET # BLD AUTO: 110 10E9/L (ref 150–450)
POTASSIUM SERPL-SCNC: 4.5 MMOL/L (ref 3.4–5.3)
POTASSIUM SERPL-SCNC: 4.5 MMOL/L (ref 3.4–5.3)
RBC # BLD AUTO: 3.13 10E12/L (ref 4.4–5.9)
SODIUM SERPL-SCNC: 135 MMOL/L (ref 133–144)
SPECIMEN SOURCE: NORMAL
WBC # BLD AUTO: 16.7 10E9/L (ref 4–11)

## 2017-11-19 PROCEDURE — 84132 ASSAY OF SERUM POTASSIUM: CPT | Performed by: ANESTHESIOLOGY

## 2017-11-19 PROCEDURE — 00000146 ZZHCL STATISTIC GLUCOSE BY METER IP

## 2017-11-19 PROCEDURE — 71010 XR CHEST PORT 1 VW: CPT

## 2017-11-19 PROCEDURE — 83735 ASSAY OF MAGNESIUM: CPT | Performed by: PHYSICIAN ASSISTANT

## 2017-11-19 PROCEDURE — 93010 ELECTROCARDIOGRAM REPORT: CPT | Performed by: INTERNAL MEDICINE

## 2017-11-19 PROCEDURE — A9270 NON-COVERED ITEM OR SERVICE: HCPCS | Mod: GY | Performed by: SURGERY

## 2017-11-19 PROCEDURE — A9270 NON-COVERED ITEM OR SERVICE: HCPCS | Mod: GY | Performed by: THORACIC SURGERY (CARDIOTHORACIC VASCULAR SURGERY)

## 2017-11-19 PROCEDURE — 25000128 H RX IP 250 OP 636: Performed by: THORACIC SURGERY (CARDIOTHORACIC VASCULAR SURGERY)

## 2017-11-19 PROCEDURE — 25000132 ZZH RX MED GY IP 250 OP 250 PS 637: Mod: GY | Performed by: ANESTHESIOLOGY

## 2017-11-19 PROCEDURE — 25000125 ZZHC RX 250: Performed by: PHYSICIAN ASSISTANT

## 2017-11-19 PROCEDURE — 85027 COMPLETE CBC AUTOMATED: CPT | Performed by: PHYSICIAN ASSISTANT

## 2017-11-19 PROCEDURE — 25000132 ZZH RX MED GY IP 250 OP 250 PS 637: Mod: GY | Performed by: SURGERY

## 2017-11-19 PROCEDURE — 25000132 ZZH RX MED GY IP 250 OP 250 PS 637: Mod: GY | Performed by: THORACIC SURGERY (CARDIOTHORACIC VASCULAR SURGERY)

## 2017-11-19 PROCEDURE — 36415 COLL VENOUS BLD VENIPUNCTURE: CPT | Performed by: ANESTHESIOLOGY

## 2017-11-19 PROCEDURE — 99233 SBSQ HOSP IP/OBS HIGH 50: CPT | Mod: GC | Performed by: ANESTHESIOLOGY

## 2017-11-19 PROCEDURE — A9270 NON-COVERED ITEM OR SERVICE: HCPCS | Mod: GY | Performed by: ANESTHESIOLOGY

## 2017-11-19 PROCEDURE — 20000004 ZZH R&B ICU UMMC

## 2017-11-19 PROCEDURE — 25000128 H RX IP 250 OP 636: Performed by: PHYSICIAN ASSISTANT

## 2017-11-19 PROCEDURE — 25000128 H RX IP 250 OP 636: Performed by: ANESTHESIOLOGY

## 2017-11-19 PROCEDURE — 25000132 ZZH RX MED GY IP 250 OP 250 PS 637: Mod: GY | Performed by: PHYSICIAN ASSISTANT

## 2017-11-19 PROCEDURE — A9270 NON-COVERED ITEM OR SERVICE: HCPCS | Mod: GY | Performed by: PHYSICIAN ASSISTANT

## 2017-11-19 PROCEDURE — 93005 ELECTROCARDIOGRAM TRACING: CPT

## 2017-11-19 PROCEDURE — 36415 COLL VENOUS BLD VENIPUNCTURE: CPT | Performed by: PHYSICIAN ASSISTANT

## 2017-11-19 PROCEDURE — 80048 BASIC METABOLIC PNL TOTAL CA: CPT | Performed by: PHYSICIAN ASSISTANT

## 2017-11-19 RX ORDER — FUROSEMIDE 10 MG/ML
20 INJECTION INTRAMUSCULAR; INTRAVENOUS ONCE
Status: COMPLETED | OUTPATIENT
Start: 2017-11-19 | End: 2017-11-19

## 2017-11-19 RX ORDER — POTASSIUM CHLORIDE 750 MG/1
40 TABLET, EXTENDED RELEASE ORAL ONCE
Status: COMPLETED | OUTPATIENT
Start: 2017-11-19 | End: 2017-11-19

## 2017-11-19 RX ORDER — DEXTROSE MONOHYDRATE 25 G/50ML
25-50 INJECTION, SOLUTION INTRAVENOUS
Status: DISCONTINUED | OUTPATIENT
Start: 2017-11-19 | End: 2017-11-19

## 2017-11-19 RX ORDER — ALBUMIN, HUMAN INJ 5% 5 %
12.5 SOLUTION INTRAVENOUS ONCE
Status: DISCONTINUED | OUTPATIENT
Start: 2017-11-19 | End: 2017-11-19

## 2017-11-19 RX ORDER — UREA 10 %
500 LOTION (ML) TOPICAL DAILY
Status: DISCONTINUED | OUTPATIENT
Start: 2017-11-19 | End: 2017-11-22 | Stop reason: HOSPADM

## 2017-11-19 RX ORDER — METOPROLOL TARTRATE 1 MG/ML
INJECTION, SOLUTION INTRAVENOUS
Status: DISCONTINUED
Start: 2017-11-19 | End: 2017-11-19 | Stop reason: HOSPADM

## 2017-11-19 RX ORDER — METOPROLOL TARTRATE 1 MG/ML
5 INJECTION, SOLUTION INTRAVENOUS EVERY 10 MIN PRN
Status: DISCONTINUED | OUTPATIENT
Start: 2017-11-19 | End: 2017-11-22 | Stop reason: HOSPADM

## 2017-11-19 RX ORDER — METOPROLOL TARTRATE 25 MG/1
25 TABLET, FILM COATED ORAL EVERY 12 HOURS
Status: DISCONTINUED | OUTPATIENT
Start: 2017-11-19 | End: 2017-11-20

## 2017-11-19 RX ORDER — NICOTINE POLACRILEX 4 MG
15-30 LOZENGE BUCCAL
Status: DISCONTINUED | OUTPATIENT
Start: 2017-11-19 | End: 2017-11-19

## 2017-11-19 RX ORDER — AMOXICILLIN 250 MG
2 CAPSULE ORAL 2 TIMES DAILY
Status: DISCONTINUED | OUTPATIENT
Start: 2017-11-19 | End: 2017-11-22 | Stop reason: HOSPADM

## 2017-11-19 RX ORDER — POLYETHYLENE GLYCOL 3350 17 G/17G
17 POWDER, FOR SOLUTION ORAL DAILY
Status: DISCONTINUED | OUTPATIENT
Start: 2017-11-19 | End: 2017-11-22 | Stop reason: HOSPADM

## 2017-11-19 RX ADMIN — ACETAMINOPHEN 975 MG: 325 TABLET, FILM COATED ORAL at 18:56

## 2017-11-19 RX ADMIN — HEPARIN SODIUM 5000 UNITS: 5000 INJECTION, SOLUTION INTRAVENOUS; SUBCUTANEOUS at 23:18

## 2017-11-19 RX ADMIN — SENNOSIDES AND DOCUSATE SODIUM 2 TABLET: 8.6; 5 TABLET ORAL at 09:36

## 2017-11-19 RX ADMIN — FINASTERIDE 5 MG: 5 TABLET, FILM COATED ORAL at 11:51

## 2017-11-19 RX ADMIN — HYDROMORPHONE HYDROCHLORIDE 0.5 MG: 1 INJECTION, SOLUTION INTRAMUSCULAR; INTRAVENOUS; SUBCUTANEOUS at 07:13

## 2017-11-19 RX ADMIN — FUROSEMIDE 20 MG: 10 INJECTION, SOLUTION INTRAVENOUS at 09:27

## 2017-11-19 RX ADMIN — POLYETHYLENE GLYCOL 3350 17 G: 17 POWDER, FOR SOLUTION ORAL at 09:28

## 2017-11-19 RX ADMIN — ATORVASTATIN CALCIUM 80 MG: 80 TABLET, FILM COATED ORAL at 19:36

## 2017-11-19 RX ADMIN — ACETAMINOPHEN 975 MG: 325 TABLET, FILM COATED ORAL at 02:13

## 2017-11-19 RX ADMIN — SENNOSIDES AND DOCUSATE SODIUM 2 TABLET: 8.6; 5 TABLET ORAL at 19:36

## 2017-11-19 RX ADMIN — ACETAMINOPHEN 650 MG: 325 TABLET, FILM COATED ORAL at 23:17

## 2017-11-19 RX ADMIN — POTASSIUM CHLORIDE 40 MEQ: 750 TABLET, EXTENDED RELEASE ORAL at 08:21

## 2017-11-19 RX ADMIN — HEPARIN SODIUM 5000 UNITS: 5000 INJECTION, SOLUTION INTRAVENOUS; SUBCUTANEOUS at 16:48

## 2017-11-19 RX ADMIN — Medication 500 MG: at 08:21

## 2017-11-19 RX ADMIN — TAMSULOSIN HYDROCHLORIDE 0.4 MG: 0.4 CAPSULE ORAL at 22:09

## 2017-11-19 RX ADMIN — METOPROLOL TARTRATE 5 MG: 5 INJECTION, SOLUTION INTRAVENOUS at 07:37

## 2017-11-19 RX ADMIN — HEPARIN SODIUM 5000 UNITS: 5000 INJECTION, SOLUTION INTRAVENOUS; SUBCUTANEOUS at 09:36

## 2017-11-19 RX ADMIN — OMEPRAZOLE 40 MG: 20 CAPSULE, DELAYED RELEASE ORAL at 09:27

## 2017-11-19 RX ADMIN — OXYCODONE HYDROCHLORIDE 15 MG: 5 TABLET ORAL at 01:07

## 2017-11-19 RX ADMIN — PHENYTOIN SODIUM 400 MG: 100 CAPSULE ORAL at 22:09

## 2017-11-19 RX ADMIN — ASPIRIN 325 MG ORAL TABLET 325 MG: 325 PILL ORAL at 07:27

## 2017-11-19 RX ADMIN — METOPROLOL TARTRATE 25 MG: 25 TABLET ORAL at 19:36

## 2017-11-19 RX ADMIN — METOPROLOL TARTRATE 25 MG: 25 TABLET ORAL at 08:22

## 2017-11-19 RX ADMIN — ACETAMINOPHEN 975 MG: 325 TABLET, FILM COATED ORAL at 10:55

## 2017-11-19 RX ADMIN — OXYCODONE HYDROCHLORIDE 10 MG: 5 TABLET ORAL at 10:55

## 2017-11-19 ASSESSMENT — VISUAL ACUITY
OU: NORMAL ACUITY

## 2017-11-19 NOTE — PROGRESS NOTES
Social Work Services Progress Note    Hospital Day: 4  Date of Initial Social Work Evaluation: not yet completed  Collaborated with: pt.'s wife    Data: at this time, the d/c recommendations are to TCU; d/c timeframe unknown  Intervention: d/t pt.'s current cognitive issues, this writer called [facility] ([phone])  Assessment: Went through d/c planning with [facility] and discussed reasons from rehab placement and benefits of this. [facility] was in agreement. Prefers referrals to [place] and [place] (about 25 miles from home). [facility]. Also informed [facility] that Medicare does not cover transportation for d/c.   [facility] (just a few miles from their home) [phone] (**no admission staff on Monday 11/20, the nurse asked referral be faxed to them on Monday, fax # [phone], attn. Admissions)  [facility] in [place] [phone]  [facility] in [place] [phone]  Plan:    Discharge Plans in Progress: recs are TCU    Barriers to d/c plan: medical readiness    Follow up Plan: SW will f/u with the above listed facilities, making referrals

## 2017-11-19 NOTE — PROGRESS NOTES
Patient complained of sharp chest pain at around 2100 with intermittent tingling. Moaning in pain and slightly diaphoretic. BP, HR, and oxygen status stable. CVTS notified- Stat CXR, labs, and 12-lead EKG. Ordered one time dose of 0.5 mg dilaudid and increased oxycodone dosage. Labs WNL. Discussed EKG with CVTS and MD believed it to be incisional pain. Pt given dilaudid and 10 mg oxycodone with significant relief. Will continue to monitor patient closely and update MDs as needed.

## 2017-11-19 NOTE — PROGRESS NOTES
CVTS Daily Note  11/19/2017  Attending: Rod Sanchez, *    S:   No overnight events.    Woke up this AM in A-fib (EKG confirmed) with SOB and lightheadedness, MAPs in 70's but had dropped to as low as 51, HR < 120.    Pt seen sitting at edge of bed looking uncomfortable. His SOB was improved, though, on masked O2 and sitting upright.    Improved UOP overnight.   Denied F/C/Sweats.  No CP, SOB, or calf pain.    Tolerating diet with only occasional mild nausea.  - BM.  - Flatus.    Ambulated well without assistance.    Pain controlled well.    O:   Vitals:    11/18/17 1438 11/18/17 2000 11/18/17 2355 11/19/17 0400   BP: 98/51 112/54 114/63    BP Location: Left arm      Resp: 24 22     Temp: 97.8  F (36.6  C) 99.3  F (37.4  C) 99.6  F (37.6  C)    TempSrc: Oral Oral Oral    SpO2: 99% 94% 95% 92%   Weight:       Height:         Vitals:    11/16/17 0133 11/17/17 0400 11/18/17 0530   Weight: 80.2 kg (176 lb 12.9 oz) 79.7 kg (175 lb 11.3 oz) 81.8 kg (180 lb 5.4 oz)       Intake/Output Summary (Last 24 hours) at 11/19/17 0737  Last data filed at 11/19/17 0700   Gross per 24 hour   Intake             2429 ml   Output             2738 ml   Net             -309 ml       MAPs: 71 - 79  Gen: AAO x 3, pleasant, NAD  CV: irregular, -118, no murmurs, rubs, or gallops.   Pulm: CTA, no rhonchi or wheezes  Abd: soft, non-tender, no guarding  Ext: trace peripheral edema, 1 + pitting  Incision: dressing clean, dry, intact, no erythema  Chest Tube sites: dressings clean and dry, serosanguinous, no air leak, output 550 cc total (meds and L pleural)        Labs:  BMP    Recent Labs  Lab 11/19/17  0658 11/18/17  2131 11/18/17  0339 11/17/17  1808    132* 141 144   POTASSIUM 4.5 4.2 5.1 4.1   CHLORIDE 101 100 110* 111*   BEAN 8.2* 8.0* 8.5 8.9   CO2 28 26 22 24   BUN 23 23 15 10   CR 0.94 1.09 0.82 0.79   * 127* 151* 153*     CBC    Recent Labs  Lab 11/19/17  0658 11/18/17  2131 11/18/17  0339 11/18/17  0139   WBC  16.7* 18.5* 16.6* 15.9*   RBC 3.13* 3.30* 3.82* 3.81*   HGB 9.4* 10.0* 11.6* 11.7*   HCT 28.7* 30.3* 35.5* 35.3*   MCV 92 92 93 93   MCH 30.0 30.3 30.4 30.7   MCHC 32.8 33.0 32.7 33.1   RDW 13.3 13.1 13.4 13.3   * 106* 127* 139*     INR    Recent Labs  Lab 11/18/17  0339 11/17/17  1808 11/17/17  1630 11/17/17  0412   INR 1.42* 1.28* 1.46* 1.09      Hepatic Panel   Lab Results   Component Value Date    AST 18 11/16/2017     Lab Results   Component Value Date    ALT 37 11/16/2017     Lab Results   Component Value Date    ALBUMIN 3.2 11/16/2017     GLUCOSE:     Recent Labs  Lab 11/19/17  0726 11/18/17  2205 11/18/17  2131 11/18/17  1652 11/18/17  1151 11/18/17  0817 11/18/17  0358 11/18/17  0339  11/17/17  1808  11/17/17  1632 11/17/17  1600 11/17/17  1543   GLC  --   --  127*  --   --   --   --  151*  --  153*  --  161* 161* 153*   * 134*  --  127* 126* 120* 136*  --   < >  --   < >  --   --   --    < > = values in this interval not displayed.      Imaging:    CXR 11/19-   Portable AP view of the chest. Sternotomy wires, right upper approach PICC, mediastinal  drains and left-sided chest tube are unchanged in position.  Minimal postoperative pneumomediastinum, similar to prior. Streaky left basilar opacities are  unchanged. Heart size is normal. Decreased subcutaneous emphysema along the anterior chest wall.  IMPRESSION: Stable postoperative chest with streaky left basilar atelectasis or infection.      A/P:   Luis Eduardo Oliver is a 77 year old male who is status post CAB on 11/17 with Dr Thompson.  Up to floor 11/18, back to ICU for close monitoring in A-fib 11/19 AM.     Neuro:   - Neuro intact  - Tylenol and oxycodone PRN    CV:   - ICU: Pressors weaned POD #1 by morning. No arrhythmia, HD stable.  - Atrial Fib 11/19 7:15 am. Metop 5 mg IV x 1 and had better HR control (80-90's)    Pulm:   - Extubated POD #0 to 4 lpm via NC.   - Pulm toilet, IS, activity and deep breathing    ID:   - WBC 16.7, afebrile, no  signs or symptoms of infection     GI / FEN:  - Full liquid diet until + flatus, start bowel regimen  - Nutritional supplements      Renal / :   - Creatinine 0.94, improved UOP. Diuresis PRN only.     Heme:   - Hgb 9.4, Plts 110    Endo:   - SG well controlled, Medium SSI    PPX:   - Protonix, Hep 5000 U q 8 SQ    Dispo:   - 6C since 11/18, back to ICU 11/19 for A-fib monitoring (soft BP, SOB)  - DC summary to [facility] in [place], [place]      Discussed with CVTS Fellow and Dr Sanchez and Dr Thompson   Staff surgeons have been informed of changes through both  verbal and written communication.      Hoang Belle PA-C  Cardiothoracic Surgery  Pager [phone]    7:37 AM   November 19, 2017

## 2017-11-19 NOTE — PLAN OF CARE
Problem: Patient Care Overview  Goal: Plan of Care/Patient Progress Review  Pt had a lab draw via PICC line this morning and he started having SOB and back pain.  Pt was symptomatic of new onset afib with SOB & grunting.  Pt was sat up and put on 7L oxyplus.  CT were draining with no apparent problems.  .5 dilaudid was admin and pain subsided, 5mg metoprolol was admin to reduce HR, but it dropped MAPs into the 50-60 range.  Pt was transferred off 6c to 4E room 511 for stablization.  Report given to Sharla DE SOUZA.

## 2017-11-19 NOTE — PLAN OF CARE
Problem: Patient Care Overview  Goal: Plan of Care/Patient Progress Review  PT / 6C - PT orders received and acknowledged. Pt transferring to higher level of care, will cancel PT evaluation this date and reschedule as appropriate.

## 2017-11-19 NOTE — PLAN OF CARE
Problem: Patient Care Overview  Goal: Plan of Care/Patient Progress Review  OT 6C: Cancel - pt hypotensive and tenuous respiratory status, pt not medically appropriate for therapies. Will reschedule.

## 2017-11-19 NOTE — PLAN OF CARE
Received patient 1400 from previous RN. Patient alert and answers questions, d/o to time/situation at times. Bed alarm/chair alarm in use. Patient assited to bed with some SOB/dyspnea, oxiplus in use 4L with o2 sat 99%. Patient reports continued SOB. MD Goodson from CVTS notified, also made aware of some confusion. Afib 80s, b/p MAP 65+, patient tolerated yogurt and coffee for lunch. Will encourage dinner. IS with encouragement, crackles on auscultation. Family updated. Will continue to monitor O2 sat, respiratory status as well as neuro status. Plan to return to  tomorrow.

## 2017-11-19 NOTE — PLAN OF CARE
Problem: Cardiac Surgery (Adult)  Goal: Signs and Symptoms of Listed Potential Problems Will be Absent, Minimized or Managed (Cardiac Surgery)  Signs and symptoms of listed potential problems will be absent, minimized or managed by discharge/transition of care (reference Cardiac Surgery (Adult) CPG).   Outcome: Improving  Patient down to 4E from 6C at 0900 due to afib, soft bp and increased work of breathing and oxygen requirements. A/o to questions. Did state he thought his room looked like a shed and not a hospital room, possibly some delirious beginning/confusion. This did occur after oxycodone was given but patient had been receiving it on the floor prior to coming to icu. Has pain at incision and a pain in left posterior back at times, MD aware. Up in chair with Ax2. SOB and dyspnea when up, BP was soft when first up to chair but improved. afib 80s. Had come from floor on 8 L oxi plus, currently on 4 L oxi plus. Lungs with fine crackles. Lasix given 20mg this AM. [place] removed, voided 150ml since removal. Nop bm since last Monday per patient. Active bs. Poor appetite, drank an ensure, just ordered yogurt at time of note. Palpable pulses. Temp 99s. Using IS with encouragement. Continue to monitor respiratory, hemodynamics status and neuro/delirium. CT had tipped en route to 4E from 6c, new container applied at 1330.

## 2017-11-19 NOTE — PROGRESS NOTES
Updated wife, answered questions.       Hoang Belle PA-C  Cardiothoracic Surgery  Pager [phone]    4:06 PM   November 19, 2017

## 2017-11-19 NOTE — PROVIDER NOTIFICATION
Provider notified (edgar and amanda) at 1315 in regards to patient up in chair, MD in room with this RN and patient. Patient SOB with activity, mouth  Breather so 4l oxi plus applied instead of 3 l nc. BP soft when up to chair MAP down to 59 but slowly trending back up to 68.  States he has pain in left posterior area of ribs. Also updated MD that patient answers all questions appropriately but has had some confusion.delirium as he was stating he thought he was in a shed and not the hospital.   Per Provider above no new orders. Continue to monitor.

## 2017-11-19 NOTE — PROGRESS NOTES
CV ICU Progress Note    POD: 2 Days Post-Op  LOS: 4    Admission History: Luis Eduardo Oliver is a 77 year old male admitted to the ICU for postoperative management following a 2 vessel CABG by Dr Sanchez on 17.  He also has a PMH of Unstable angina 2/2 severe left main disease s/p IABP s/p CABG, Seizure disorder, BPH, and Rob's esophagus.      S/Interval History:  New A-fib this AM.  Transferred to CVICU this am.  Metoprolol increased to 25, rate controlled.      O:    Temp: 97.4  F (36.3  C) Temp  Min: 97.4  F (36.3  C)  Max: 99.6  F (37.6  C)  Resp: (!) 32 Resp  Min: 8  Max: 36  SpO2: (!) 84 % SpO2  Min: 84 %  Max: 100 %    No Data Recorded  Heart Rate: 109 Heart Rate  Min: 74  Max: 109  BP: 106/78 Systolic (24hrs), Av , Min:81 , Max:114   Diastolic (24hrs), Av, Min:49, Max:78    Resp: 32  Date 17 0700 - 17 0659   Shift 4025-3032 6562-8022 5185-7925 24 Hour Total   I  N  T  A  K  E   I.V. 9   9    Shift Total  (mL/kg) 9  (0.11)   9  (0.11)   O  U  T  P  U  T   Shift Total  (mL/kg)       Weight (kg) 81.8 81.8 81.8 81.8         Past Medical History:   Diagnosis Date     BPH (benign prostatic hyperplasia)      Cholelithiasis      Seizure disorder (H)        Past Surgical History:   Procedure Laterality Date     APPENDECTOMY       CHOLECYSTECTOMY       COLONOSCOPY  2017     UPPER GI ENDOSCOPY  2017     VASECTOMY         Physical Exam    General: Sitting up in bed, breathing room air, no distress.   Neck: Supple, no tracheal deviation  Cardiovascular: RRR  Pumonary: Non labored breathing.  CTAB   Abdomen: Soft, non distended, non tender.   Ext: W/o edema, wwp  Neuro: Moves all extremities.  Communicates in full conversation.  No deficits noted.      Lab Results   Component Value Date    WBC 16.7 (H) 2017    HGB 9.4 (L) 2017    HCT 28.7 (L) 2017     (L) 2017     2017    POTASSIUM 4.5 2017    CHLORIDE 101 2017    CO2 28  11/19/2017    BUN 23 11/19/2017    CR 0.94 11/19/2017     (H) 11/19/2017    TROPI 0.017 11/15/2017    AST 18 11/16/2017    ALT 37 11/16/2017    ALKPHOS 108 11/16/2017    BILITOTAL 0.2 11/16/2017    INR 1.42 (H) 11/18/2017         IV fluid REPLACEMENT ONLY           Assessment and Plan: Luis Eduardo Oliver is a 77 year old male admitted to the ICU for postoperative management following a 2 vessel CABG by Dr Sanchez on 11/17/17.  He also has a PMH of Unstable angina 2/2 severe left main disease s/p IABP s/p CABG, Seizure disorder, BPH, and Rob's esophagus.      Neuro:     Seizure disorder    Home Phenytoin    Pain    Scheduled Tylenol    PRN Oxy & Dilaudid  Resp:     Tolerating NC well.    Continue to monitor  CV:     CAD 2/2 severe left main disease 2/p 2 vessel CABG (11/7/17)    Stable    Off all pressors    Monitor hemodynamics    Metoprolol, Statin, ASA    New A-Fib    Metoprolol increased from 12.5 to 25 bid.      Rate controlled.    Continue to monitor for resolution.  If no resolution after 48 hours, consider anticoagulation.  GI/FEN:     Advancing cardiac diet    Rob's esophagus    Home Omeprazole  Renal:     Monitor electrolytes, Cr, and urine output     Lytes goals K>4 and Mag>2    Strict I/O's    BPH:    Home Finasteride & Tamsulosin    D/C Hennessy.  If Uout drops, bladder scan and consider replacing hennessy if necessary.    Endo:    q4h glucose checks, SSI to serum glucose >80, <180 mg/dL  ID:     Perioperative antibiotics    Monitor WBC daily  Heme:     Hgb stable    Continue to monitor    DVT prophylaxis    Heparin 5000 U SQ q 8 hours  Prophylaxis: SCDs, PPI, SQ heparin, ASA  Lines:   PIV x2     Hennessy 2 Days Post-Op     D/C hennessy      Dispo: Maintain in CVICU tonight.  TTF tomorrow if stable.    Code Status: Full Code      Eddy Pablo DO  Anesthesiology Resident  CVICU Service, *[id number]    Patient seen and staffed with attending.

## 2017-11-19 NOTE — PROGRESS NOTES
Cardiothoracic Surgery Progress Note    POD: 2 Days Post-Op (CABGx2 - ICU Re-admission)  LOS: 4      S/Interval History:  - New A-fib this AM while in bed (6C).    - Transferred to CVICU this am.    - Responded well to Metoprolol increased dose of 25, rate controlled as well as lasix 20mg IV.      O:    Temp: 97.4  F (36.3  C) Temp  Min: 97.4  F (36.3  C)  Max: 99.6  F (37.6  C)  Resp: (!) 32 Resp  Min: 8  Max: 36  SpO2: (!) 84 % SpO2  Min: 84 %  Max: 100 %    No Data Recorded  Heart Rate: 109 Heart Rate  Min: 74  Max: 109  BP: 106/78 Systolic (24hrs), Av , Min:81 , Max:114   Diastolic (24hrs), Av, Min:51, Max:78    Resp: 32  Date 17 0700 - 17 0659   Shift 3932-7914 3872-4024 7784-7585 24 Hour Total   I  N  T  A  K  E   I.V. 9   9    Shift Total  (mL/kg) 9  (0.11)   9  (0.11)   O  U  T  P  U  T   Shift Total  (mL/kg)       Weight (kg) 81.8 81.8 81.8 81.8       Intake/Output Summary (Last 24 hours) at 17 1156  Last data filed at 17 1100   Gross per 24 hour   Intake             1210 ml   Output             2715 ml   Net            -1505 ml       Past Medical History:   Diagnosis Date     BPH (benign prostatic hyperplasia)      Cholelithiasis      Seizure disorder (H)        Past Surgical History:   Procedure Laterality Date     APPENDECTOMY       CHOLECYSTECTOMY       COLONOSCOPY  2017     UPPER GI ENDOSCOPY  2017     VASECTOMY         Physical Exam:    - General: Sitting up in bed, breathing room air, no distress.   - Neck: Supple, no tracheal deviation  - Cardiovascular: RRR  - Pumonary: Non labored breathing.  CTAB   - Abdomen: Soft, non distended, non tender.   - Ext: W/o edema, wwp  - Neuro: Moves all extremities.  Communicates in full conversation.  No deficits noted.      Lab Results   Component Value Date    WBC 16.7 (H) 2017    HGB 9.4 (L) 2017    HCT 28.7 (L) 2017     (L) 2017     2017    POTASSIUM 4.5 2017     CHLORIDE 101 11/19/2017    CO2 28 11/19/2017    BUN 23 11/19/2017    CR 0.94 11/19/2017     (H) 11/19/2017    TROPI 0.017 11/15/2017    AST 18 11/16/2017    ALT 37 11/16/2017    ALKPHOS 108 11/16/2017    BILITOTAL 0.2 11/16/2017    INR 1.42 (H) 11/18/2017         IV fluid REPLACEMENT ONLY           Assessment and Plan:   - Luis Eduardo Oliver is a 77 year old male admitted to the ICU for postoperative management following a 2 vessel CABG by Dr Sanchez on 11/17/17.    - He also has a PMH of Unstable angina 2/2 severe left main disease s/p IABP s/p CABG, Seizure disorder, BPH, and Rob's esophagus.      Neuro:   - Seizure disorder  - Home Phenytoin  - Pain: Scheduled Tylenol / PRN Oxy & Dilaudid  Resp:   - Tolerating NC well.  - CXR statisfactory  - Chest tube output 1400 total since surgery ( 700/24hr ).  - Continue to monitor  CV:   - CAD 2/2 severe left main disease 2/p 2 vessel CABG (11/7/17)  - Stable  / Off all pressors  - Monitor hemodynamics  - Metoprolol, Statin, ASA  - New A-Fib  - Metoprolol increased from 12.5 to 25 bid.    - Rate controlled for now, Will consider Amiodarone if not rate controlled.  - Continue to monitor for resolution.  If no resolution after 48 hours, consider anticoagulation.  - -VE 1.5 L , Good UOP (1.2L overnight).  GI/FEN:   - Advancing cardiac diet  - Hx of Rob's esophagus  - Home Omeprazole  Renal:   - Monitor electrolytes, Cr, and urine output   - Lytes goals K>4 and Mag>2  - Strict I/O's  - BPH: - Home Finasteride & Tamsulosin  - D/C Hennessy.  If Uout drops, bladder scan and consider replacing hennessy if necessary.    Endo:  - q4h glucose checks, SSI to serum glucose >80, <180 mg/dL  ID:   - Perioperative antibiotics  - Monitor WBC daily , 16.7 today.  Heme:   - Hgb stable 9.4  - Continue to monitor  - DVT prophylaxis  - Heparin 5000 U SQ q 8 hours  - Prophylaxis: SCDs, PPI, SQ heparin, ASA    Plan to monitor in CVICU tonight.  TTF tomorrow if stable.        Discussed with  CVTS Dr. Sanchez.   Dr. Hodge (Staff surgeon oncall) has rounded on patient and was informed of recent changes.     Mahendra Reyna  CTS Transplant Fellow  [phone]

## 2017-11-19 NOTE — PLAN OF CARE
Problem: Patient Care Overview  Goal: Plan of Care/Patient Progress Review  Pt POD1 from 2VCABG.  Pt A&Ox4, SR , VSS, pain controlled with IV dilaudid, oxy & scheduled tylenol.  CTx3 to sxn, hennessy intact with adequate UO in response to IV lasix.  Sternal and CT dsgs CDI.  3L PICC and 2 PIV all SL.  Pt satting well on RA.  Continue to monitor and contact CVTS with concerns.

## 2017-11-19 NOTE — PLAN OF CARE
Problem: Patient Care Overview  Goal: Plan of Care/Patient Progress Review  D: Pt stable, SR, T Max 99.6. Incisional pain adequately controlled with IV dilaudid x 1, oxycodone and tylenol. No shortness of breath noted. CT x 3 to sxn. Capped pacer wires.  I: Monitored/assessed pt. Assisted with cares.  A: Pt stable and comfortable.  P: Continue to monitor/assess pt, contact provider with concerns.

## 2017-11-20 ENCOUNTER — APPOINTMENT (OUTPATIENT)
Dept: PHYSICAL THERAPY | Facility: CLINIC | Age: 77
DRG: 236 | End: 2017-11-20
Attending: THORACIC SURGERY (CARDIOTHORACIC VASCULAR SURGERY)
Payer: MEDICARE

## 2017-11-20 LAB
ANION GAP SERPL CALCULATED.3IONS-SCNC: 6 MMOL/L (ref 3–14)
BLD PROD TYP BPU: NORMAL
BLD PROD TYP BPU: NORMAL
BLD UNIT ID BPU: 0
BLD UNIT ID BPU: 0
BLOOD PRODUCT CODE: NORMAL
BLOOD PRODUCT CODE: NORMAL
BPU ID: NORMAL
BPU ID: NORMAL
BUN SERPL-MCNC: 27 MG/DL (ref 7–30)
CALCIUM SERPL-MCNC: 8 MG/DL (ref 8.5–10.1)
CHLORIDE SERPL-SCNC: 104 MMOL/L (ref 94–109)
CO2 SERPL-SCNC: 27 MMOL/L (ref 20–32)
CREAT SERPL-MCNC: 0.77 MG/DL (ref 0.66–1.25)
ERYTHROCYTE [DISTWIDTH] IN BLOOD BY AUTOMATED COUNT: 13.1 % (ref 10–15)
GFR SERPL CREATININE-BSD FRML MDRD: >90 ML/MIN/1.7M2
GLUCOSE BLDC GLUCOMTR-MCNC: 104 MG/DL (ref 70–99)
GLUCOSE BLDC GLUCOMTR-MCNC: 117 MG/DL (ref 70–99)
GLUCOSE BLDC GLUCOMTR-MCNC: 128 MG/DL (ref 70–99)
GLUCOSE BLDC GLUCOMTR-MCNC: 98 MG/DL (ref 70–99)
GLUCOSE SERPL-MCNC: 120 MG/DL (ref 70–99)
HCT VFR BLD AUTO: 26.9 % (ref 40–53)
HGB BLD-MCNC: 8.9 G/DL (ref 13.3–17.7)
MCH RBC QN AUTO: 30.1 PG (ref 26.5–33)
MCHC RBC AUTO-ENTMCNC: 33.1 G/DL (ref 31.5–36.5)
MCV RBC AUTO: 91 FL (ref 78–100)
PLATELET # BLD AUTO: 127 10E9/L (ref 150–450)
POTASSIUM SERPL-SCNC: 4.3 MMOL/L (ref 3.4–5.3)
RBC # BLD AUTO: 2.96 10E12/L (ref 4.4–5.9)
SODIUM SERPL-SCNC: 136 MMOL/L (ref 133–144)
TRANSFUSION STATUS PATIENT QL: NORMAL
WBC # BLD AUTO: 13.8 10E9/L (ref 4–11)

## 2017-11-20 PROCEDURE — 25000128 H RX IP 250 OP 636: Performed by: STUDENT IN AN ORGANIZED HEALTH CARE EDUCATION/TRAINING PROGRAM

## 2017-11-20 PROCEDURE — 97116 GAIT TRAINING THERAPY: CPT | Mod: GP

## 2017-11-20 PROCEDURE — 25000132 ZZH RX MED GY IP 250 OP 250 PS 637: Mod: GY | Performed by: PHYSICIAN ASSISTANT

## 2017-11-20 PROCEDURE — A9270 NON-COVERED ITEM OR SERVICE: HCPCS | Mod: GY | Performed by: PHYSICIAN ASSISTANT

## 2017-11-20 PROCEDURE — 85027 COMPLETE CBC AUTOMATED: CPT | Performed by: PHYSICIAN ASSISTANT

## 2017-11-20 PROCEDURE — A9270 NON-COVERED ITEM OR SERVICE: HCPCS | Mod: GY | Performed by: ANESTHESIOLOGY

## 2017-11-20 PROCEDURE — 25000128 H RX IP 250 OP 636: Performed by: ANESTHESIOLOGY

## 2017-11-20 PROCEDURE — 25000128 H RX IP 250 OP 636: Performed by: THORACIC SURGERY (CARDIOTHORACIC VASCULAR SURGERY)

## 2017-11-20 PROCEDURE — 97530 THERAPEUTIC ACTIVITIES: CPT | Mod: GP

## 2017-11-20 PROCEDURE — 40000193 ZZH STATISTIC PT WARD VISIT

## 2017-11-20 PROCEDURE — 00000146 ZZHCL STATISTIC GLUCOSE BY METER IP

## 2017-11-20 PROCEDURE — 25000132 ZZH RX MED GY IP 250 OP 250 PS 637: Mod: GY | Performed by: THORACIC SURGERY (CARDIOTHORACIC VASCULAR SURGERY)

## 2017-11-20 PROCEDURE — 25000132 ZZH RX MED GY IP 250 OP 250 PS 637: Mod: GY | Performed by: STUDENT IN AN ORGANIZED HEALTH CARE EDUCATION/TRAINING PROGRAM

## 2017-11-20 PROCEDURE — 97161 PT EVAL LOW COMPLEX 20 MIN: CPT | Mod: GP

## 2017-11-20 PROCEDURE — 80048 BASIC METABOLIC PNL TOTAL CA: CPT | Performed by: PHYSICIAN ASSISTANT

## 2017-11-20 PROCEDURE — A9270 NON-COVERED ITEM OR SERVICE: HCPCS | Mod: GY | Performed by: THORACIC SURGERY (CARDIOTHORACIC VASCULAR SURGERY)

## 2017-11-20 PROCEDURE — 12000008 ZZH R&B INTERMEDIATE UMMC

## 2017-11-20 PROCEDURE — 36415 COLL VENOUS BLD VENIPUNCTURE: CPT | Performed by: PHYSICIAN ASSISTANT

## 2017-11-20 PROCEDURE — 25000132 ZZH RX MED GY IP 250 OP 250 PS 637: Mod: GY | Performed by: ANESTHESIOLOGY

## 2017-11-20 RX ORDER — FUROSEMIDE 10 MG/ML
20 INJECTION INTRAMUSCULAR; INTRAVENOUS ONCE
Status: COMPLETED | OUTPATIENT
Start: 2017-11-20 | End: 2017-11-20

## 2017-11-20 RX ORDER — BISACODYL 10 MG
10 SUPPOSITORY, RECTAL RECTAL DAILY PRN
Status: DISCONTINUED | OUTPATIENT
Start: 2017-11-20 | End: 2017-11-22 | Stop reason: HOSPADM

## 2017-11-20 RX ADMIN — ATORVASTATIN CALCIUM 80 MG: 80 TABLET, FILM COATED ORAL at 19:37

## 2017-11-20 RX ADMIN — ACETAMINOPHEN 975 MG: 325 TABLET, FILM COATED ORAL at 10:45

## 2017-11-20 RX ADMIN — ACETAMINOPHEN 975 MG: 325 TABLET, FILM COATED ORAL at 03:57

## 2017-11-20 RX ADMIN — ACETAMINOPHEN 650 MG: 325 TABLET, FILM COATED ORAL at 23:48

## 2017-11-20 RX ADMIN — ACETAMINOPHEN 650 MG: 325 TABLET, FILM COATED ORAL at 18:24

## 2017-11-20 RX ADMIN — FUROSEMIDE 20 MG: 10 INJECTION, SOLUTION INTRAVENOUS at 07:48

## 2017-11-20 RX ADMIN — METOPROLOL TARTRATE 37.5 MG: 25 TABLET, FILM COATED ORAL at 07:49

## 2017-11-20 RX ADMIN — POLYETHYLENE GLYCOL 3350 17 G: 17 POWDER, FOR SOLUTION ORAL at 07:50

## 2017-11-20 RX ADMIN — HEPARIN SODIUM 5000 UNITS: 5000 INJECTION, SOLUTION INTRAVENOUS; SUBCUTANEOUS at 07:49

## 2017-11-20 RX ADMIN — OMEPRAZOLE 40 MG: 20 CAPSULE, DELAYED RELEASE ORAL at 07:49

## 2017-11-20 RX ADMIN — SENNOSIDES AND DOCUSATE SODIUM 2 TABLET: 8.6; 5 TABLET ORAL at 07:49

## 2017-11-20 RX ADMIN — ASPIRIN 325 MG ORAL TABLET 325 MG: 325 PILL ORAL at 07:49

## 2017-11-20 RX ADMIN — PHENYTOIN SODIUM 400 MG: 100 CAPSULE ORAL at 21:35

## 2017-11-20 RX ADMIN — HEPARIN SODIUM 5000 UNITS: 5000 INJECTION, SOLUTION INTRAVENOUS; SUBCUTANEOUS at 18:24

## 2017-11-20 RX ADMIN — TAMSULOSIN HYDROCHLORIDE 0.4 MG: 0.4 CAPSULE ORAL at 21:35

## 2017-11-20 RX ADMIN — Medication 500 MG: at 07:50

## 2017-11-20 RX ADMIN — ACETAMINOPHEN 325 MG: 325 TABLET, FILM COATED ORAL at 14:29

## 2017-11-20 RX ADMIN — METOPROLOL TARTRATE 37.5 MG: 25 TABLET, FILM COATED ORAL at 19:36

## 2017-11-20 RX ADMIN — HEPARIN SODIUM 5000 UNITS: 5000 INJECTION, SOLUTION INTRAVENOUS; SUBCUTANEOUS at 23:48

## 2017-11-20 RX ADMIN — FUROSEMIDE 20 MG: 10 INJECTION, SOLUTION INTRAVENOUS at 18:25

## 2017-11-20 RX ADMIN — FINASTERIDE 5 MG: 5 TABLET, FILM COATED ORAL at 07:51

## 2017-11-20 NOTE — PLAN OF CARE
Problem: Patient Care Overview  Goal: Plan of Care/Patient Progress Review  Outcome: Improving  D: Post CAB  I/A: Neuro: patient forgetful/impulsive at times. reorientates easily.   Resp: Diminished LS throughout; RA-2L NC for sats >90%  Card: SR 70s-90s. MAP >65.  GI: Active BS  : Voids spontaneously

## 2017-11-20 NOTE — PROGRESS NOTES
Patient remained stable throughout shift. HR 80-90s, /50-60s, MAP 70-80s, NSR,  2 L NC, afebrile, alert and oriented but has been confused at times for previous shifts. Voided approximately 1 L, received 20 mg IV lasix this am. CT output minimal (10-20 cc/hr). Given senna and miralax for bowel regimen and likely will need a suppository if still unable to have a BM.   Plan is to continue to monitor patient and work with PT/OT. Plan is to transfer to  today.

## 2017-11-20 NOTE — PROGRESS NOTES
CVTS Daily Note  11/20/2017  Attending: Rod Sanchez, *    S:   No overnight events.  Pt seen at bedside resting comfortably.    no acute complaints.      No further afib. Somewhat confused per nursing    O:   Vitals:    11/20/17 0400 11/20/17 0500 11/20/17 0600 11/20/17 0700   BP: 111/55 (!) 78/66 101/56 110/60   BP Location:       Resp: 19 20 16 21   Temp: 99.1  F (37.3  C)      TempSrc: Oral      SpO2: 98% 96% 98% 98%   Weight: 80.8 kg (178 lb 2.1 oz)      Height:         Vitals:    11/17/17 0400 11/18/17 0530 11/20/17 0400   Weight: 79.7 kg (175 lb 11.3 oz) 81.8 kg (180 lb 5.4 oz) 80.8 kg (178 lb 2.1 oz)     + 1 kg since admit      Intake/Output Summary (Last 24 hours) at 11/20/17 0804  Last data filed at 11/20/17 0700   Gross per 24 hour   Intake              860 ml   Output             3101 ml   Net            -2241 ml     - General: Sitting up in bed, breathing room air, no distress.   - Neck: Supple, no tracheal deviation  - Cardiovascular: RRR  - Pumonary: Non labored breathing.  CTAB   - Abdomen: Soft, non distended, non tender.   - Ext: W/o edema, wwp  - Incisions: c/d/i chest tubes with no air leaks, SS output.        Labs:  BMP  Recent Labs  Lab 11/20/17  0304 11/19/17  1443 11/19/17  0658 11/18/17  2131 11/18/17  0339 11/17/17  1808  11/17/17  0412  11/16/17  0429     --  135 132* 141 144  < > 143  < > 143   POTASSIUM 4.3 4.5 4.5 4.2 5.1 4.1  < > 3.6  < > 3.6   CHLORIDE 104  --  101 100 110* 111*  --  108  < > 109   CO2 27  --  28 26 22 24  --  26  < > 27   BUN 27  --  23 23 15 10  --  10  < > 12   CR 0.77  --  0.94 1.09 0.82 0.79  --  0.72  < > 0.77   *  --  116* 127* 151* 153*  < > 99  < > 94   MAG  --   --  2.3  --  2.5* 3.0*  --  2.0  --  2.1   PHOS  --   --   --   --  4.2 3.0  --  2.6  --  2.6   < > = values in this interval not displayed.  CBC  Recent Labs  Lab 11/20/17  0304 11/19/17  0658 11/18/17  2131 11/18/17  0339   WBC 13.8* 16.7* 18.5* 16.6*   HGB 8.9* 9.4* 10.0*  11.6*   * 110* 106* 127*     INR/PTT  Recent Labs  Lab 11/18/17  0339 11/17/17  1808 11/17/17  1630 11/17/17  0412 11/16/17  2101   INR 1.42* 1.28* 1.46* 1.09 1.07   PTT 36 34 27  --  88*     ABG  Recent Labs  Lab 11/17/17  2353 11/17/17  2148 11/17/17  1808 11/17/17  1632   PH 7.36 7.39 7.31* 7.33*   PCO2 46* 38 48* 45   PO2 134* 120* 120* 239*   HCO3 26 23 24 24     LFT  Recent Labs  Lab 11/16/17  2101 11/15/17  0040   AST 18 26   ALT 37 37   ALKPHOS 108 100   BILITOTAL 0.2 0.5   ALBUMIN 3.2* 3.4       GLUCOSE:     Recent Labs  Lab 11/20/17  0304 11/19/17  2212 11/19/17  1856 11/19/17  1207 11/19/17  0845 11/19/17  0726 11/19/17  0658 11/18/17  2205 11/18/17  2131  11/18/17  0339  11/17/17  1808  11/17/17  1632   *  --   --   --   --   --  116*  --  127*  --  151*  --  153*  --  161*   BGM  --  129* 144* 153* 97 116*  --  134*  --   < >  --   < >  --   < >  --    < > = values in this interval not displayed.        A/P:   Luis Eduardo Oliver is a 77 year old male who is status post 2 vCABG by Dr. Sanchez/Jay on 11/17/17.     Neuro:   - Home phenytoin  - Scheduled Tylenol, PRN oxy, dilaudid    CV:   - Metoprolol, Statin, ASA  - Metopolol increased to 37.5 from 25  - Afib rate controlled    Pulm:   - Aggressive pulmonary toilet    ID:   - Monitor WBC    GI / FEN:  - Home omeprazole  - Cardiac diet     Renal / :   - Home finasteride, tamsulosin     Heme:   - Monitor Hgb, stable  Heparin for DVT ppx    Endo:   - SSI    PPX:   - Omeprazole  - Heparin subq      Dispo:   - 6C today      Discussed with CVTS Fellow   Staff surgeons have been informed of changes through both  verbal and written communication.        Tomás Latif   Surgery PGY3  [phone]

## 2017-11-20 NOTE — PROGRESS NOTES
CVTS:     Pulled mediastinal tubes and temporary pacing wires without immediate complication.    Planning transfer to [address] [place] [address].     Hoang Belle PA-C  Cardiothoracic Surgery  Pager [phone]    2:55 PM   November 20, 2017

## 2017-11-20 NOTE — PLAN OF CARE
"Problem: Patient Care Overview  Goal: Plan of Care/Patient Progress Review  PT 4E: Evaluation completed and treatment initiated.     Discharge Planner PT   Patient plan for discharge: Home  Current status:  Patient completes bed mobility and transfers with CGA/min A, maintains sternal precautions with min cues. Ambulated ~400' while pushing w/c, additional ~50' without AD, CGA for mild unsteadiness but no overt LOB. Mild GALLARDO and self report mild pain and chest feeling \"heavy\". VSS throughout HR 80s-100, SpO2>90% on 2L NC.  Barriers to return to prior living situation: Mild balance impairments, generalized deconditioning, post surgical precautions, 12 steps to main level of home.  Recommendations for discharge: Anticipate discharge home with assist and OP Phase II CR  Rationale for recommendations: Patient demonstrates mild balance impairments and generalized deconditioning, anticipate with increased activity and demonstration of safe stair navigation patient will be appropriate to discharge home when medically appropriate.      "

## 2017-11-20 NOTE — PROGRESS NOTES
CV ICU Progress Note    POD: 3 Days Post-Op  LOS: 5    Admission History: Luis Eduardo Oliver is a 77 year old male admitted to the ICU for postoperative management following a 2 vessel CABG by Dr Sanchez on 17.  He also has a PMH of Unstable angina 2/2 severe left main disease s/p IABP s/p CABG, Seizure disorder, BPH, and Rob's esophagus.      S/Interval History:  No overnight events. Patient stabilized shortly after arriving to the ICU.      O:    Temp: 98.8  F (37.1  C) Temp  Min: 98.7  F (37.1  C)  Max: 99.7  F (37.6  C)  Resp: 26 Resp  Min: 11  Max: 82  SpO2: 98 % SpO2  Min: 94 %  Max: 100 %    No Data Recorded  Heart Rate: 92 Heart Rate  Min: 76  Max: 92  BP: 110/59 Systolic (24hrs), Av , Min:78 , Max:120   Diastolic (24hrs), Av, Min:51, Max:72    Resp: 26  Date 17 0700 - 17 0659   Shift 4057-3771 1708-5803 7546-1406 24 Hour Total   I  N  T  A  K  E   I.V. 9   9    Shift Total  (mL/kg) 9  (0.11)   9  (0.11)   O  U  T  P  U  T   Shift Total  (mL/kg)       Weight (kg) 81.8 81.8 81.8 81.8         Past Medical History:   Diagnosis Date     BPH (benign prostatic hyperplasia)      Cholelithiasis      Seizure disorder (H)        Past Surgical History:   Procedure Laterality Date     APPENDECTOMY       CHOLECYSTECTOMY       COLONOSCOPY  2017     UPPER GI ENDOSCOPY  2017     VASECTOMY         Physical Exam    General: Sitting up in bed, breathing room air, no distress.   Neck: Supple, no tracheal deviation  Cardiovascular: RRR  Pumonary: Non labored breathing.  CTAB   Abdomen: Soft, non distended, non tender.   Ext: W/o edema, wwp  Neuro: Moves all extremities.  Communicates in full conversation.  No deficits noted.      Lab Results   Component Value Date    WBC 13.8 (H) 2017    HGB 8.9 (L) 2017    HCT 26.9 (L) 2017     (L) 2017     2017    POTASSIUM 4.3 2017    CHLORIDE 104 2017    CO2 27 2017    BUN 27 2017    CR  0.77 11/20/2017     (H) 11/20/2017    TROPI 0.017 11/15/2017    AST 18 11/16/2017    ALT 37 11/16/2017    ALKPHOS 108 11/16/2017    BILITOTAL 0.2 11/16/2017    INR 1.42 (H) 11/18/2017         IV fluid REPLACEMENT ONLY           Assessment and Plan: Luis Eduardo Oliver is a 77 year old male admitted to the ICU for postoperative management following a 2 vessel CABG by Dr Sanchez on 11/17/17.  He also has a PMH of Unstable angina 2/2 severe left main disease s/p IABP s/p CABG, Seizure disorder, BPH, and Rob's esophagus.      Neuro:     Seizure disorder    Home Phenytoin    Pain    Scheduled Tylenol    PRN Oxy & Dilaudid  Resp:     Tolerating NC well.    Continue to monitor  CV:     CAD 2/2 severe left main disease 2/p 2 vessel CABG (11/17/17)    Monitor hemodynamics    Metoprolol, Statin, ASA    New A-Fib    Metoprolol increased from 25 to 37.5 BID    Rate controlled.    GI/FEN:     Advancing cardiac diet    Rob's esophagus    Home Omeprazole  Renal:     Monitor electrolytes, Cr, and urine output     Lytes goals K>4 and Mag>2    Strict I/O's    BPH:    Home Finasteride & Tamsulosin   Endo:    q4h glucose checks, SSI to serum glucose >80, <180 mg/dL  ID:     Perioperative antibiotics    Monitor WBC daily  Heme:     Hgb stable    Continue to monitor    DVT prophylaxis    Heparin 5000 U SQ q 8 hours  Prophylaxis: SCDs, PPI, SQ heparin, ASA  Lines:   PIV x2     Hennessy 3 Days Post-Op     D/C hennessy      Dispo:  TTF    Code Status: Full Code      Marko Evans DO  Anesthesiology Resident  CVICU Service, *[id number]    Patient seen and staffed with attending.

## 2017-11-21 ENCOUNTER — APPOINTMENT (OUTPATIENT)
Dept: GENERAL RADIOLOGY | Facility: CLINIC | Age: 77
DRG: 236 | End: 2017-11-21
Attending: PHYSICIAN ASSISTANT
Payer: MEDICARE

## 2017-11-21 ENCOUNTER — APPOINTMENT (OUTPATIENT)
Dept: PHYSICAL THERAPY | Facility: CLINIC | Age: 77
DRG: 236 | End: 2017-11-21
Attending: SURGERY
Payer: MEDICARE

## 2017-11-21 ENCOUNTER — APPOINTMENT (OUTPATIENT)
Dept: OCCUPATIONAL THERAPY | Facility: CLINIC | Age: 77
DRG: 236 | End: 2017-11-21
Attending: SURGERY
Payer: MEDICARE

## 2017-11-21 LAB
ANION GAP SERPL CALCULATED.3IONS-SCNC: 6 MMOL/L (ref 3–14)
BUN SERPL-MCNC: 24 MG/DL (ref 7–30)
CALCIUM SERPL-MCNC: 8.1 MG/DL (ref 8.5–10.1)
CHLORIDE SERPL-SCNC: 104 MMOL/L (ref 94–109)
CO2 SERPL-SCNC: 30 MMOL/L (ref 20–32)
CREAT SERPL-MCNC: 0.78 MG/DL (ref 0.66–1.25)
ERYTHROCYTE [DISTWIDTH] IN BLOOD BY AUTOMATED COUNT: 13.2 % (ref 10–15)
GFR SERPL CREATININE-BSD FRML MDRD: >90 ML/MIN/1.7M2
GLUCOSE BLDC GLUCOMTR-MCNC: 102 MG/DL (ref 70–99)
GLUCOSE SERPL-MCNC: 103 MG/DL (ref 70–99)
HCT VFR BLD AUTO: 27.1 % (ref 40–53)
HGB BLD-MCNC: 8.9 G/DL (ref 13.3–17.7)
INTERPRETATION ECG - MUSE: NORMAL
MCH RBC QN AUTO: 30.2 PG (ref 26.5–33)
MCHC RBC AUTO-ENTMCNC: 32.8 G/DL (ref 31.5–36.5)
MCV RBC AUTO: 92 FL (ref 78–100)
PLATELET # BLD AUTO: 200 10E9/L (ref 150–450)
POTASSIUM SERPL-SCNC: 3.6 MMOL/L (ref 3.4–5.3)
RBC # BLD AUTO: 2.95 10E12/L (ref 4.4–5.9)
SODIUM SERPL-SCNC: 139 MMOL/L (ref 133–144)
WBC # BLD AUTO: 11.9 10E9/L (ref 4–11)

## 2017-11-21 PROCEDURE — 40000193 ZZH STATISTIC PT WARD VISIT

## 2017-11-21 PROCEDURE — 85027 COMPLETE CBC AUTOMATED: CPT | Performed by: THORACIC SURGERY (CARDIOTHORACIC VASCULAR SURGERY)

## 2017-11-21 PROCEDURE — 40000133 ZZH STATISTIC OT WARD VISIT: Performed by: OCCUPATIONAL THERAPIST

## 2017-11-21 PROCEDURE — 25000132 ZZH RX MED GY IP 250 OP 250 PS 637: Mod: GY | Performed by: THORACIC SURGERY (CARDIOTHORACIC VASCULAR SURGERY)

## 2017-11-21 PROCEDURE — 71010 XR CHEST PORT 1 VW: CPT

## 2017-11-21 PROCEDURE — 25000132 ZZH RX MED GY IP 250 OP 250 PS 637: Mod: GY | Performed by: PHYSICIAN ASSISTANT

## 2017-11-21 PROCEDURE — 97116 GAIT TRAINING THERAPY: CPT | Mod: GP

## 2017-11-21 PROCEDURE — 25000132 ZZH RX MED GY IP 250 OP 250 PS 637: Mod: GY | Performed by: SURGERY

## 2017-11-21 PROCEDURE — 80048 BASIC METABOLIC PNL TOTAL CA: CPT | Performed by: THORACIC SURGERY (CARDIOTHORACIC VASCULAR SURGERY)

## 2017-11-21 PROCEDURE — 97530 THERAPEUTIC ACTIVITIES: CPT | Mod: GP

## 2017-11-21 PROCEDURE — 25000132 ZZH RX MED GY IP 250 OP 250 PS 637: Mod: GY | Performed by: STUDENT IN AN ORGANIZED HEALTH CARE EDUCATION/TRAINING PROGRAM

## 2017-11-21 PROCEDURE — A9270 NON-COVERED ITEM OR SERVICE: HCPCS | Mod: GY | Performed by: THORACIC SURGERY (CARDIOTHORACIC VASCULAR SURGERY)

## 2017-11-21 PROCEDURE — 25000132 ZZH RX MED GY IP 250 OP 250 PS 637: Mod: GY | Performed by: ANESTHESIOLOGY

## 2017-11-21 PROCEDURE — A9270 NON-COVERED ITEM OR SERVICE: HCPCS | Mod: GY | Performed by: PHYSICIAN ASSISTANT

## 2017-11-21 PROCEDURE — A9270 NON-COVERED ITEM OR SERVICE: HCPCS | Mod: GY | Performed by: ANESTHESIOLOGY

## 2017-11-21 PROCEDURE — 00000146 ZZHCL STATISTIC GLUCOSE BY METER IP

## 2017-11-21 PROCEDURE — 97110 THERAPEUTIC EXERCISES: CPT | Mod: GO | Performed by: OCCUPATIONAL THERAPIST

## 2017-11-21 PROCEDURE — 93005 ELECTROCARDIOGRAM TRACING: CPT

## 2017-11-21 PROCEDURE — 25000128 H RX IP 250 OP 636: Performed by: ANESTHESIOLOGY

## 2017-11-21 PROCEDURE — 97535 SELF CARE MNGMENT TRAINING: CPT | Mod: GO | Performed by: OCCUPATIONAL THERAPIST

## 2017-11-21 PROCEDURE — 40000986 XR CHEST PORT 1 VW

## 2017-11-21 PROCEDURE — A9270 NON-COVERED ITEM OR SERVICE: HCPCS | Mod: GY | Performed by: SURGERY

## 2017-11-21 PROCEDURE — 93010 ELECTROCARDIOGRAM REPORT: CPT | Performed by: INTERNAL MEDICINE

## 2017-11-21 PROCEDURE — 21400006 ZZH R&B CCU INTERMEDIATE UMMC

## 2017-11-21 RX ADMIN — HEPARIN SODIUM 5000 UNITS: 5000 INJECTION, SOLUTION INTRAVENOUS; SUBCUTANEOUS at 07:30

## 2017-11-21 RX ADMIN — OXYCODONE HYDROCHLORIDE 10 MG: 5 TABLET ORAL at 09:53

## 2017-11-21 RX ADMIN — METOPROLOL TARTRATE 37.5 MG: 25 TABLET, FILM COATED ORAL at 19:24

## 2017-11-21 RX ADMIN — PHENYTOIN SODIUM 400 MG: 100 CAPSULE ORAL at 21:54

## 2017-11-21 RX ADMIN — TAMSULOSIN HYDROCHLORIDE 0.4 MG: 0.4 CAPSULE ORAL at 21:54

## 2017-11-21 RX ADMIN — ATORVASTATIN CALCIUM 80 MG: 80 TABLET, FILM COATED ORAL at 19:24

## 2017-11-21 RX ADMIN — FINASTERIDE 5 MG: 5 TABLET, FILM COATED ORAL at 07:32

## 2017-11-21 RX ADMIN — ASPIRIN 325 MG ORAL TABLET 325 MG: 325 PILL ORAL at 07:30

## 2017-11-21 RX ADMIN — METOPROLOL TARTRATE 37.5 MG: 25 TABLET, FILM COATED ORAL at 07:31

## 2017-11-21 RX ADMIN — SENNOSIDES AND DOCUSATE SODIUM 2 TABLET: 8.6; 5 TABLET ORAL at 07:30

## 2017-11-21 RX ADMIN — ACETAMINOPHEN 650 MG: 325 TABLET, FILM COATED ORAL at 07:30

## 2017-11-21 RX ADMIN — Medication 500 MG: at 07:32

## 2017-11-21 RX ADMIN — POLYETHYLENE GLYCOL 3350 17 G: 17 POWDER, FOR SOLUTION ORAL at 07:30

## 2017-11-21 RX ADMIN — HEPARIN SODIUM 5000 UNITS: 5000 INJECTION, SOLUTION INTRAVENOUS; SUBCUTANEOUS at 16:20

## 2017-11-21 RX ADMIN — POTASSIUM CHLORIDE 20 MEQ: 750 TABLET, EXTENDED RELEASE ORAL at 07:30

## 2017-11-21 RX ADMIN — SENNOSIDES AND DOCUSATE SODIUM 1 TABLET: 8.6; 5 TABLET ORAL at 19:24

## 2017-11-21 RX ADMIN — OXYCODONE HYDROCHLORIDE 10 MG: 5 TABLET ORAL at 19:06

## 2017-11-21 RX ADMIN — OMEPRAZOLE 40 MG: 20 CAPSULE, DELAYED RELEASE ORAL at 07:30

## 2017-11-21 ASSESSMENT — PAIN DESCRIPTION - DESCRIPTORS: DESCRIPTORS: PRESSURE

## 2017-11-21 ASSESSMENT — VISUAL ACUITY: OU: NORMAL ACUITY

## 2017-11-21 NOTE — PROGRESS NOTES
Pt alert and oriented, pain managed with tylenol, pt went on long walk around unit, on 2L O2 via NC, voiding adequately, had one large BM, 2 chest tubes pulled, pacer wires pulled.  1 Pleural chest tube still in place with minimal output. Afebrile.

## 2017-11-21 NOTE — PROGRESS NOTES
CVTS Daily Note  11/21/2017  Attending: Rod Sanchez, *    S:   No overnight events.  Pt seen at bedside resting comfortably.    no acute complaints.  Denies any CP, SOB, lightheadedness, dizziness.     Tolerating diet, +BM, +flatus,  NSR, no arrhythmias.     O:   Vitals:    11/21/17 0200 11/21/17 0300 11/21/17 0400 11/21/17 0500   BP: (!) 84/47 118/68 111/56 126/59   BP Location:   Left arm    Resp: 18  15    Temp:   100.2  F (37.9  C)    TempSrc:   Oral    SpO2: 95% 95% (!) 88% 94%   Weight:   75.9 kg (167 lb 5.3 oz)    Height:         Vitals:    11/18/17 0530 11/20/17 0400 11/21/17 0400   Weight: 81.8 kg (180 lb 5.4 oz) 80.8 kg (178 lb 2.1 oz) 75.9 kg (167 lb 5.3 oz)       Intake/Output Summary (Last 24 hours) at 11/21/17 1019  Last data filed at 11/21/17 0800   Gross per 24 hour   Intake              780 ml   Output             1685 ml   Net             -905 ml       General: Sitting up in bed, breathing room air, no distress.   Neck: Supple, no tracheal deviation, no JVD  Cardiovascular: RRR  Pumonary: Non labored breathing.  fine crackles in bases, otherwise CTA, no wheezing or rhonchi  Abdomen: Soft, non distended, non tender. +BS  Ext: W/o edema, wwp  Incisions: c/d/i chest tubes with no air leaks, 128cc/2cc SS output.        Labs:  BMP  Recent Labs  Lab 11/21/17  0346 11/20/17  0304 11/19/17  1443 11/19/17  0658 11/18/17  2131 11/18/17  0339 11/17/17  1808  11/17/17  0412  11/16/17  0429    136  --  135 132* 141 144  < > 143  < > 143   POTASSIUM 3.6 4.3 4.5 4.5 4.2 5.1 4.1  < > 3.6  < > 3.6   CHLORIDE 104 104  --  101 100 110* 111*  --  108  < > 109   CO2 30 27  --  28 26 22 24  --  26  < > 27   BUN 24 27  --  23 23 15 10  --  10  < > 12   CR 0.78 0.77  --  0.94 1.09 0.82 0.79  --  0.72  < > 0.77   * 120*  --  116* 127* 151* 153*  < > 99  < > 94   MAG  --   --   --  2.3  --  2.5* 3.0*  --  2.0  --  2.1   PHOS  --   --   --   --   --  4.2 3.0  --  2.6  --  2.6   < > = values in this  interval not displayed.  CBC    Recent Labs  Lab 11/21/17  0346 11/20/17  0304 11/19/17  0658 11/18/17  2131   WBC 11.9* 13.8* 16.7* 18.5*   HGB 8.9* 8.9* 9.4* 10.0*    127* 110* 106*     INR/PTT    Recent Labs  Lab 11/18/17  0339 11/17/17  1808 11/17/17  1630 11/17/17  0412 11/16/17  2101   INR 1.42* 1.28* 1.46* 1.09 1.07   PTT 36 34 27  --  88*     ABG    Recent Labs  Lab 11/17/17  2353 11/17/17  2148 11/17/17  1808 11/17/17  1632   PH 7.36 7.39 7.31* 7.33*   PCO2 46* 38 48* 45   PO2 134* 120* 120* 239*   HCO3 26 23 24 24     LFT    Recent Labs  Lab 11/16/17  2101 11/15/17  0040   AST 18 26   ALT 37 37   ALKPHOS 108 100   BILITOTAL 0.2 0.5   ALBUMIN 3.2* 3.4       GLUCOSE:     Recent Labs  Lab 11/21/17  0734 11/21/17  0346 11/20/17  2133 11/20/17  1833 11/20/17  1202 11/20/17  0801 11/20/17  0304 11/19/17  2212  11/19/17  0658  11/18/17  2131  11/18/17  0339  11/17/17  1808   GLC  --  103*  --   --   --   --  120*  --   --  116*  --  127*  --  151*  --  153*   *  --  104* 98 117* 128*  --  129*  < >  --   < >  --   < >  --   < >  --    < > = values in this interval not displayed.        A/P:   Luis Eduardo Oliver is a 77 year old male who is status post 2 vCABG by Dr. Sanchez/Jay on 11/17/17.     Neuro:   - Home phenytoin  - Scheduled Tylenol, PRN oxy, dilaudid    CV:   - Atorvastatin 80 mg,  mg  - Post-operative atrial fibrillation - POD 2 at 7am, transferred back to ICU for soft BPs and symptomatic with SOB. Converted with IV metoprolol, later that day, has remained in NSR since. No need for anticoagulation given less than 24 hours of afib. Will reconsider if recurs. Metoprolol increased to 37.5 mg BID 11/20    Pulm:   - Aggressive pulmonary toilet/ IS, encourage activity  - Left pleural tube to suction, no airleak, minimal output, CXR today stable, will remove today (11/21), f/u CXR this PM    GI / FEN:  - Home omeprazole, continue bowel regimen, +BM  - Cardiac diet     Renal / :   - Cr WNL  at baseline, adequate UOP, on PTA finasteride and tamsulosin  - Given lasix 20 mg IV x1 11/20 with great response, net negative 2.8L yesterday, will replace K today, hold any further diuresis today, weight below pre-op    Heme:   - Hgb 8.9->8.9, stable, no signs or symptoms of bleeding    Endo:   - BG , d/c SSI    ID: completed orlin-op abx  - WBC trending down 13->11, no signs or symptoms of infection    PPX:   - Omeprazole, Heparin subq    Dispo:   - Transfer to floor, has been floor status since 11/20,   - PT/OT recommending home with assist and OP cardiac rehab, possible d/c tomorrow (11/22)      Discussed with CVTS Fellow   Staff surgeons have been informed of changes through both  verbal and written communication.      Fred Gonzalez PA-C  Cardiothoracic Surgery  November 21, 2017 8:22 AM   p: [phone]

## 2017-11-21 NOTE — PROGRESS NOTES
11/20/17 1400   Quick Adds   Type of Visit Initial PT Evaluation   Living Environment   Lives With spouse   Living Arrangements house   Home Accessibility bed and bath are not on the first floor;stairs within home;tub/shower is not walk in   Number of Stairs to Enter Home 0   Number of Stairs Within Home 12  (2 flights of 6 steps from garage to main level)   Transportation Available car;family or friend will provide   Living Environment Comment Patient lives with wife in cabin home in [facility]. Patient has 3 children who live in other states.   Self-Care   Usual Activity Tolerance good   Current Activity Tolerance moderate   Equipment Currently Used at Home none   Activity/Exercise/Self-Care Comment Pt was previously independent with all functional mobility and ADL/IADLs; reports being very active, walking, gardening, outdoor activity until approx 1-2 months ago when he began to experience increased SOB and angina which has limited his activity recently   Functional Level Prior   Ambulation 0-->independent   Transferring 0-->independent   Toileting 0-->independent   Bathing 0-->independent   Dressing 0-->independent   Eating 0-->independent   Communication 0-->understands/communicates without difficulty   Swallowing 0-->swallows foods/liquids without difficulty   Cognition 0 - no cognition issues reported   Fall history within last six months no   Which of the above functional risks had a recent onset or change? none   General Information   Onset of Illness/Injury or Date of Surgery - Date 11/15/17   Referring Physician Rod Sanchez MD   Patient/Family Goals Statement to return home   Pertinent History of Current Problem (include personal factors and/or comorbidities that impact the POC) Luis Eduardo Oliver is a 77 year old male admitted to the ICU for postoperative management following a 2 vessel CABG by Dr Sanchez on 11/17/17.  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     Precautions/Limitations fall precautions;sternal precautions   General Observations Patient greeted sitting up in bed, agreeable to PT, RN ok'd session   General Info Comments Ambulate with Assist 3x/day   Cognitive Status Examination   Orientation orientation to person, place and time   Level of Consciousness alert   Follows Commands and Answers Questions 100% of the time   Personal Safety and Judgment intact   Pain Assessment   Patient Currently in Pain Yes, see Vital Sign flowsheet   Posture    Posture Protracted shoulders   Range of Motion (ROM)   ROM Comment BLE WFL   Strength   Strength Comments BLE WFL, generalized deconditioning   Bed Mobility   Bed Mobility Comments Supine<>sit with min A   Transfer Skills   Transfer Comments sit<>stand with CGA/min A   Gait   Gait Comments Ambulates with CGA while pushing w/c, protracted shoulders and downward gaze, decreased juana and step length   Balance   Balance Comments Mild balance impairments requiiring UE support for stability/comfort   Sensory Examination   Sensory Perception Comments Denies sensory changes in extremities x4   General Therapy Interventions   Planned Therapy Interventions balance training;bed mobility training;gait training;strengthening;transfer training;risk factor education;home program guidelines;progressive activity/exercise   Clinical Impression   Criteria for Skilled Therapeutic Intervention yes, treatment indicated   PT Diagnosis impaired functional mobility   Influenced by the following impairments decreased balance, impaired posture, post surgical pain, sternal precautions, decreased activity tolerance, deconditioning   Functional limitations due to impairments safe and independent bed mobility, transfers, ambulation and stair navigation   Clinical Presentation Stable/Uncomplicated   Clinical Presentation Rationale PMH and comorbidities, change in functional mobility  "from baseline, post-surgical precautions, clinical judgement   Clinical Decision Making (Complexity) Low complexity   Therapy Frequency` 5 times/week   Predicted Duration of Therapy Intervention (days/wks) 3-5 days   Anticipated Equipment Needs at Discharge (TBD)   Anticipated Discharge Disposition Home with Assist;Home with Outpatient Therapy   Risk & Benefits of therapy have been explained Yes   Patient, Family & other staff in agreement with plan of care Yes   [facility] TM \"6 Clicks\"   2016, Trustees of [facility], under license to [employer].  All rights reserved.   6 Clicks Short Forms Basic Mobility Inpatient Short Form   [facility]  \"6 Clicks\" V.2 Basic Mobility Inpatient Short Form   1. Turning from your back to your side while in a flat bed without using bedrails? 4 - None   2. Moving from lying on your back to sitting on the side of a flat bed without using bedrails? 3 - A Little   3. Moving to and from a bed to a chair (including a wheelchair)? 3 - A Little   4. Standing up from a chair using your arms (e.g., wheelchair, or bedside chair)? 4 - None   5. To walk in hospital room? 3 - A Little   6. Climbing 3-5 steps with a railing? 3 - A Little   Basic Mobility Raw Score (Score out of 24.Lower scores equate to lower levels of function) 20   Total Evaluation Time   Total Evaluation Time (Minutes) 5     "

## 2017-11-21 NOTE — PLAN OF CARE
Problem: Patient Care Overview  Goal: Plan of Care/Patient Progress Review  Outcome: Improving    Nursing cares resumed from [id number]:  D: S/p 2v CABG 11/17; floor orders  I/A: CT removed today; repeat Cxray completed this afternoon. VSS, Tmax 99.4, on room air. SR, no ectopy. Voiding per urinal. Smear BM, passing flatus. Good po intake. Up to walk in dee and practiced climbing stairs with PT this evening.  P: Awaiting bed availability to transfer to . Will continue to monitor and notify MD of any concerns.

## 2017-11-21 NOTE — PROGRESS NOTES
"CLINICAL NUTRITION SERVICES    Reason for Assessment:  Heart-healthy nutrition education (post-op CABG). Also reviewed nutrition risk factors due to LOS.     Diet History:  Pt reports no history of receiving heart-healthy nutrition education in the past. Pt states he eats very \"unhealthy\" at home - lots of fried foods and high salt foods. He uses a salt shaker at the table as well.    Since admission, pt is tolerating diet, eating well per nursing documentation (100% of meals). No nutrition issues identified at this time.    Nutrition Diagnosis:  Food- and nutrition-related knowledge deficit r/t no previous knowledge of heart-healthy diet AEB pt report of no previous formal heart-healthy nutrition education.    Nutrition Prescription/Recs:  Continue heart-healthy diet.      Interventions:  Nutrition Education  1.  Provided verbal instruction on a heart-healthy diet.  2.  Provided handouts:  Heart Health Guidelines (includes Heart Healthy Food Choices),  How to Read Nutrition Labels, Low-Sodium Foods and Drinks, Tips for a Low-Sodium Diet, and Seasoning Your Foods Without Adding Salt.      Goals:    1.  Pt will verbalize at least four foods high in saturated or trans-fats and five foods high in sodium.    2.  Pt will list at least two interventions to make current meal plan more heart-healthy.     Follow-up:   Patient to ask any further nutrition-related questions before discharge.  In addition, pt may request outpatient RD appointment.   Inpatient RD will sign off at this time, unless consulted or pending LOS protocol.    Katarina Martini RD, LD, [facility]  CVICU Dietitian  Pager: [phone]          "

## 2017-11-21 NOTE — PLAN OF CARE
Problem: Patient Care Overview  Goal: Plan of Care/Patient Progress Review  OT/CR 4E: Pt SBA-CGA for functional transfers (min vc for compliance with sternal precautions).  Receptive to education on self cares with post surgical precautions and demonstrates SBA for LE dressing.  Pt tolerates walking approx 500ft with SBA while pushing wheelchair - initially c/o SOB and dizziness within 10 ft of walking on room air though AVSS with sats 97%, HR 87, /66 - symptoms almost immediately resolve with placement of 2L o2 via nc - anticipate symptoms were anxiety related.    Discharge Planner OT   Patient plan for discharge: Home with assist and OP CR in [place]  Current status: see above for details  Barriers to return to prior living situation: anxiety management related to activity; stairs  Recommendations for discharge: Home with assist and OP Phase II CR  Rationale for recommendations: Pt demonstrates good functional strength and is progressing activity well with therapy.  Pt will benefit from ongoing skilled monitoring and education on progression of activity through OP CR.       Entered by: Marion Burkett 11/21/2017 9:58 AM

## 2017-11-21 NOTE — PLAN OF CARE
"Problem: Patient Care Overview  Goal: Plan of Care/Patient Progress Review  PT 4E:  Patient is SBA for flat bed mobility and transfers. Patient ambulated ~125' with FWW and SBA, patient demonstrates improved stability and feels more comfortable with use of walker, will issue FWW prior to discharge. Wheeled patient to 6th floor gym to trial stairs, navigated 3 steps x4 with 1 rail and SBA/CGA, 1 standing rest break 2/2 GALLARDO and \"heart pounding\", HR Max 120, SpO2>94% throughout on RA. Patient endorsing difficulty with anxiety management, most concerning is nightly anxiety he has been experiencing since procedure. Patient also reporting anxiety with discharge home as he would like to be able to leave without concern of being re-admitted. Patient is also worried discharge tomorrow will be difficult on wife, stating \"I help her more than he helps me\". Both therapist and patient in agreement patient would benefit from additional day of therapy for improved stability and confidence with ambulation and stair navigation in order to facilitate safe discharge home.     Discharge Planner PT   Patient plan for discharge: Home with assist and OP CR in [place]  Current status: see above for details  Barriers to return to prior living situation: mild balance impairments, anxiety management related to ambulation and stairs, wife only able to provide min physical assist  Recommendations for discharge: Home with assist and OP Phase II CR, therapist will provide FWW prior to discharge  Rationale for recommendations: Pt demonstrates good functional strength and is progressing activity well with therapy, currently limited by mild GALLARDO and anxiety management. Pt will benefit from OP Phase II CR for ongoing skilled monitoring and education on progression of activity through OP CR.      "

## 2017-11-21 NOTE — PLAN OF CARE
Problem: Patient Care Overview  Goal: Individualization & Mutuality  D/A/I: Tmax 100.2. Confused in the middle of the night at 0300, alert and oriented this morning. SR 70-80's. 2L NC. Voids per urinal and bedside commode. CT output minimal.   P: Transfer orders to . Will continue to monitor and notify MD of any changes.

## 2017-11-22 ENCOUNTER — APPOINTMENT (OUTPATIENT)
Dept: OCCUPATIONAL THERAPY | Facility: CLINIC | Age: 77
DRG: 236 | End: 2017-11-22
Attending: SURGERY
Payer: MEDICARE

## 2017-11-22 VITALS
BODY MASS INDEX: 24.71 KG/M2 | DIASTOLIC BLOOD PRESSURE: 62 MMHG | RESPIRATION RATE: 18 BRPM | SYSTOLIC BLOOD PRESSURE: 117 MMHG | WEIGHT: 166.8 LBS | OXYGEN SATURATION: 97 % | HEIGHT: 69 IN | TEMPERATURE: 98.4 F

## 2017-11-22 LAB
INTERPRETATION ECG - MUSE: NORMAL
POTASSIUM SERPL-SCNC: 3.6 MMOL/L (ref 3.4–5.3)

## 2017-11-22 PROCEDURE — 25000128 H RX IP 250 OP 636: Performed by: ANESTHESIOLOGY

## 2017-11-22 PROCEDURE — 97110 THERAPEUTIC EXERCISES: CPT | Mod: GO | Performed by: OCCUPATIONAL THERAPIST

## 2017-11-22 PROCEDURE — A9270 NON-COVERED ITEM OR SERVICE: HCPCS | Mod: GY | Performed by: PHYSICIAN ASSISTANT

## 2017-11-22 PROCEDURE — 36415 COLL VENOUS BLD VENIPUNCTURE: CPT | Performed by: PHYSICIAN ASSISTANT

## 2017-11-22 PROCEDURE — 40000133 ZZH STATISTIC OT WARD VISIT: Performed by: OCCUPATIONAL THERAPIST

## 2017-11-22 PROCEDURE — 25000132 ZZH RX MED GY IP 250 OP 250 PS 637: Mod: GY | Performed by: STUDENT IN AN ORGANIZED HEALTH CARE EDUCATION/TRAINING PROGRAM

## 2017-11-22 PROCEDURE — 25000132 ZZH RX MED GY IP 250 OP 250 PS 637: Mod: GY | Performed by: PHYSICIAN ASSISTANT

## 2017-11-22 PROCEDURE — 84132 ASSAY OF SERUM POTASSIUM: CPT | Performed by: PHYSICIAN ASSISTANT

## 2017-11-22 PROCEDURE — 97535 SELF CARE MNGMENT TRAINING: CPT | Mod: GO | Performed by: OCCUPATIONAL THERAPIST

## 2017-11-22 PROCEDURE — 40000802 ZZH SITE CHECK

## 2017-11-22 PROCEDURE — 25000132 ZZH RX MED GY IP 250 OP 250 PS 637: Mod: GY | Performed by: ANESTHESIOLOGY

## 2017-11-22 PROCEDURE — A9270 NON-COVERED ITEM OR SERVICE: HCPCS | Mod: GY | Performed by: THORACIC SURGERY (CARDIOTHORACIC VASCULAR SURGERY)

## 2017-11-22 PROCEDURE — A9270 NON-COVERED ITEM OR SERVICE: HCPCS | Mod: GY | Performed by: ANESTHESIOLOGY

## 2017-11-22 PROCEDURE — 25000132 ZZH RX MED GY IP 250 OP 250 PS 637: Mod: GY | Performed by: THORACIC SURGERY (CARDIOTHORACIC VASCULAR SURGERY)

## 2017-11-22 RX ORDER — POTASSIUM CHLORIDE 750 MG/1
40 TABLET, EXTENDED RELEASE ORAL ONCE
Status: COMPLETED | OUTPATIENT
Start: 2017-11-22 | End: 2017-11-22

## 2017-11-22 RX ORDER — MAGNESIUM GLUCONATE 27 MG(500)
500 TABLET ORAL DAILY
Qty: 14 TABLET | Refills: 0 | Status: SHIPPED | OUTPATIENT
Start: 2017-11-23 | End: 2017-12-07

## 2017-11-22 RX ORDER — OXYCODONE HYDROCHLORIDE 5 MG/1
5-10 TABLET ORAL EVERY 4 HOURS PRN
Qty: 50 TABLET | Refills: 0 | Status: SHIPPED | OUTPATIENT
Start: 2017-11-22

## 2017-11-22 RX ORDER — METOPROLOL TARTRATE 50 MG
50 TABLET ORAL EVERY 12 HOURS
Status: DISCONTINUED | OUTPATIENT
Start: 2017-11-22 | End: 2017-11-22 | Stop reason: HOSPADM

## 2017-11-22 RX ORDER — LISINOPRIL 2.5 MG/1
2.5 TABLET ORAL DAILY
Qty: 40 TABLET | Refills: 0 | Status: SHIPPED | OUTPATIENT
Start: 2017-11-22

## 2017-11-22 RX ORDER — AMOXICILLIN 250 MG
2 CAPSULE ORAL 2 TIMES DAILY PRN
Qty: 50 TABLET | Refills: 0 | Status: SHIPPED | OUTPATIENT
Start: 2017-11-22

## 2017-11-22 RX ORDER — LISINOPRIL 2.5 MG/1
2.5 TABLET ORAL DAILY
Status: DISCONTINUED | OUTPATIENT
Start: 2017-11-22 | End: 2017-11-22 | Stop reason: HOSPADM

## 2017-11-22 RX ORDER — METOPROLOL TARTRATE 50 MG
50 TABLET ORAL EVERY 12 HOURS
Qty: 80 TABLET | Refills: 0 | Status: SHIPPED | OUTPATIENT
Start: 2017-11-22

## 2017-11-22 RX ORDER — ASPIRIN 325 MG
325 TABLET ORAL DAILY
Qty: 120 TABLET | Refills: 0 | Status: SHIPPED | OUTPATIENT
Start: 2017-11-23

## 2017-11-22 RX ORDER — ACETAMINOPHEN 325 MG/1
650 TABLET ORAL EVERY 6 HOURS PRN
Qty: 100 TABLET | Refills: 0 | Status: SHIPPED | OUTPATIENT
Start: 2017-11-22

## 2017-11-22 RX ADMIN — HEPARIN SODIUM 5000 UNITS: 5000 INJECTION, SOLUTION INTRAVENOUS; SUBCUTANEOUS at 00:04

## 2017-11-22 RX ADMIN — SENNOSIDES AND DOCUSATE SODIUM 2 TABLET: 8.6; 5 TABLET ORAL at 08:17

## 2017-11-22 RX ADMIN — METOPROLOL TARTRATE 37.5 MG: 25 TABLET, FILM COATED ORAL at 08:17

## 2017-11-22 RX ADMIN — ACETAMINOPHEN 650 MG: 325 TABLET, FILM COATED ORAL at 09:31

## 2017-11-22 RX ADMIN — ASPIRIN 325 MG ORAL TABLET 325 MG: 325 PILL ORAL at 08:17

## 2017-11-22 RX ADMIN — FINASTERIDE 5 MG: 5 TABLET, FILM COATED ORAL at 08:17

## 2017-11-22 RX ADMIN — HEPARIN SODIUM 5000 UNITS: 5000 INJECTION, SOLUTION INTRAVENOUS; SUBCUTANEOUS at 08:17

## 2017-11-22 RX ADMIN — ACETAMINOPHEN 650 MG: 325 TABLET, FILM COATED ORAL at 05:05

## 2017-11-22 RX ADMIN — LISINOPRIL 2.5 MG: 2.5 TABLET ORAL at 13:27

## 2017-11-22 RX ADMIN — OMEPRAZOLE 40 MG: 20 CAPSULE, DELAYED RELEASE ORAL at 08:17

## 2017-11-22 RX ADMIN — POTASSIUM CHLORIDE 40 MEQ: 750 TABLET, EXTENDED RELEASE ORAL at 15:32

## 2017-11-22 RX ADMIN — Medication 500 MG: at 08:17

## 2017-11-22 RX ADMIN — ACETAMINOPHEN 650 MG: 325 TABLET, FILM COATED ORAL at 13:27

## 2017-11-22 ASSESSMENT — PAIN DESCRIPTION - DESCRIPTORS
DESCRIPTORS: ACHING
DESCRIPTORS: ACHING

## 2017-11-22 NOTE — DISCHARGE SUMMARY
[facility], [place]   Cardi[facility] Discharge Summary     Luis Eduardo Oliver MRN# [id number]   Age: 77 year old YOB: 1940     Admitting Physician:  Rod Sanchez MD  Discharge Physician:  MARI Jimenez  Primary Care Physician:        Criselda Santacruz     DATE OF ADMISSION: 11/15/2017     DATE OF DISCHARGE: November 22, 2017          Admission Diagnoses:   1.  Severe coronary artery disease  2.  Unstable Angina   3.  Hx BPH  4.  Hx Rob's Esophagus          Discharge Diagnoses:   1.  Severe coronary artery disease, S/P coronary artery bypass x 2.   2.  Unstable Angina   3.  Hx BPH  4.  Hx Rob's Esophagus  5.  Paroxysmal atrial fibrillation (single episode)    PROCEDURES PERFORMED:   Date: 11/17/2017.  Surgeon: Dr. Rod Sanchez   1. Coronary artery bypass grafting x 2 (Left internal mammary artery to left anterior descending artery, reversed saphenous vein graft to obtuse marginal artery #1).   2. Endoscopic assisted saphenous vein harvest.     OPERATIVE FINDINGS:  1) EF 60%  2) Left internal mammary artery 3mm with excellent flow  3) Left greater saphenous vein 5mm acceptable for conduit  4) Left anterior descending artery 2.5mm and free of disease at anastomosis  5) Obtuse marginal artery 1 2.5mm and free of disease at anastomosis     CARDIOPULMONARY BYPASS TIME: 71 minutes  AORTIC CROSS CLAMP TIME: 53 minutes    INTRAOPERATIVE COMPLICATIONS:  none    PATHOLOGY RESULTS:  none     CULTURE RESULTS:    1. Nasal swab 11/17/17: no MRSA or SA detected.     DRAINS/TUBES PRESENT AT DISCHARGE:  None    CONSULTS:    1.  PT/OT    Patient discharged on aspirin:  Yes 325 mg  Patient discharged on beta blocker: yes    Patient discharged on ACE Inhibitor/ARB: yes             Discharge Disposition:   Discharged to home            Condition on Discharge:   Discharge condition: Good   Discharge vitals: Blood pressure 117/62, temperature 98.4  F  "(36.9  C), temperature source Oral, resp. rate 18, height 1.753 m (5' 9\"), weight 75.7 kg (166 lb 12.8 oz), SpO2 97 %.     Code status on discharge: Full Code     DAY OF DISCHARGE PHYSICAL EXAM:  Vitals:    11/22/17 0400 11/22/17 0500 11/22/17 0611 11/22/17 0751   BP:  106/57  125/66   BP Location:  Left arm  Left arm   Resp:  18  18   Temp:    98.7  F (37.1  C)   TempSrc:    Oral   SpO2: 97% 98%  97%   Weight:   75.7 kg (166 lb 12.8 oz)    Height:         Vitals:    11/21/17 0400 11/21/17 2020 11/22/17 0611   Weight: 75.9 kg (167 lb 5.3 oz) 76.8 kg (169 lb 6.4 oz) 75.7 kg (166 lb 12.8 oz)     MAPs: 74 - 90  Gen:  NAD, conversational  CV:  S1S2 normal, no murmurs, rubs, or gallops  Pulm:  CTA, no rhonchi or wheezes  Abd:  Soft, nondistended, NTTP  Ext: no dependent edema  Incision: Clean, dry, intact, no erythema  Chest Tube sites: Dressings clean and dry    BMP    Recent Labs  Lab 11/21/17  0346 11/20/17  0304 11/19/17  1443 11/19/17  0658 11/18/17  2131    136  --  135 132*   POTASSIUM 3.6 4.3 4.5 4.5 4.2   CHLORIDE 104 104  --  101 100   BEAN 8.1* 8.0*  --  8.2* 8.0*   CO2 30 27  --  28 26   BUN 24 27  --  23 23   CR 0.78 0.77  --  0.94 1.09   * 120*  --  116* 127*     CBC    Recent Labs  Lab 11/21/17  0346 11/20/17  0304 11/19/17  0658 11/18/17  2131   WBC 11.9* 13.8* 16.7* 18.5*   RBC 2.95* 2.96* 3.13* 3.30*   HGB 8.9* 8.9* 9.4* 10.0*   HCT 27.1* 26.9* 28.7* 30.3*   MCV 92 91 92 92   MCH 30.2 30.1 30.0 30.3   MCHC 32.8 33.1 32.8 33.0   RDW 13.2 13.1 13.3 13.1    127* 110* 106*     INR    Recent Labs  Lab 11/18/17  0339 11/17/17  1808 11/17/17  1630 11/17/17  0412   INR 1.42* 1.28* 1.46* 1.09      Hepatic Panel   Lab Results   Component Value Date    AST 18 11/16/2017     Lab Results   Component Value Date    ALT 37 11/16/2017     Lab Results   Component Value Date    ALBUMIN 3.2 11/16/2017       Recent Labs  Lab 11/21/17  0734 11/21/17  0346 11/20/17  2133 11/20/17  1833 11/20/17  1202 " 11/20/17  0801 11/20/17  0304 11/19/17  2212  11/19/17  0658  11/18/17  2131  11/18/17  0339  11/17/17  1808   GLC  --  103*  --   --   --   --  120*  --   --  116*  --  127*  --  151*  --  153*   *  --  104* 98 117* 128*  --  129*  < >  --   < >  --   < >  --   < >  --    < > = values in this interval not displayed.    BRIEF HISTORY OF ILLNESS:  Luis Eduardo Oliver is a 77 yr old man w/ pmh of seizure disorder (no sz >20 yrs), BPH, and Rob's esophagus who was admitted to [facility] for abnormal stress echo, now transferred for consideration for CABG in setting of severe LM disease. Patient dx-ed w/ Rob's in 3/2017. Stated since EGD, he had substernal CP with exertion that radiated to b/l arms and improved with rest. No associated SOB, dizziness, diaphoresis, n/t, n/v/d, or weakness. Previously was very active, walking up to 7 miles per day and carrying up to 40#. Lately activity had been limited by chest pain and GALLARDO. Denied any orthopnea, PND, abdominal bloating, or lower extremity edema. Was recently ill w/ flu-like sxs, now resolved. Has chronic dry cough from Rob's esophagus, no change. Has family history of cardiac disease. Is a never smoker, non-drinker.      He presented on 11/13 for outpatient stress echo that was noted to be severely abnormal and concerning for 3V disease. He was admitted for further evaluation. Angiogram performed on 11/14, report unavailable, but CD images sent with patient. Report was severe >90% stenosis of Left main. Balloon pump was placed and patient was transferred to [facility] for evaluation for CABG 11/15/17. Labs at OSH all wnl (BMP, CBC, LFTs, INR, lipids, A1c, TSH). Troponin trend was 0.01--0.03-->-0.02. He was started on heparin and nitro drips and was then pain free. He received ticagrelor load of 180, then started on 90 BID.     HOSPITAL COURSE: Luis Eduardo Oliver is a 77 year old male who on 11/17/2017 underwent the above-named procedures.  He tolerated the  operation well and postoperatively was admitted to the CVICU.  He was extubated on POD # 0 to 4 lpm via NC.  His ICU stay was relatively uncomplicated and treatment included the usual hemodynamic support and weaning of pressors.      He was transferred to the post-surgical telemetry unit on evening of POD # 1 without concerns.  On POD #2, he woke up in atrial fibrillation with RVR up to 120's and was very SOB.  His MAPs transiently dropped to low 50's but recovered within minutes. IV metprolol gave him better rate control, PO was given that AM as ordered and he was sent back to the ICU for closer HD monitoring.  He was given 20 mg IV lasix and SOB improved.  He later converted to NSR.     His second ICU stay was remarkable for post-op delirium. This slowed his ability to work with PT/OT.  He was not anticoagulated due to A-fib lasting <24 hrs. Chest tubes were removed without incident.     He returned to the telemonitoring floor 11/21 with discharge planning underway. Therapies continued to see him, he progressed quickly as the delirium and confusion subsided. He had already been weaned of supplemental oxygen and his pain was well controlled.     Prior to discharge, his pain was controlled well, he was able to perform most ADLs and ambulate without difficulty, and had full return of bowel and bladder function.  On November 22, 2017, he was discharged to home in stable condition with outpatient cardiac rehab.    ECHOCARDIOGRAM, 11/16/2017-   Technically difficult study with poor acoustic windows.  Global and regional left ventricular function is normal with an EF of 60-65%.  Right ventricular function, chamber size, wall motion, and thickness are normal.  Pulmonary artery systolic pressure cannot be assessed.  The inferior vena cava was normal in size with preserved respiratory  variability. Estimated mean right atrial pressure is 3 mmHg.  No pericardial effusion is present.  Previous study not available for  comparison.  _________________________________________________  Left Ventricle-  Global and regional left ventricular function is normal with an EF of 60-65%.  Left ventricular wall thickness is normal. Left ventricular size is normal.  Left ventricular diastolic function is indeterminate. No regional wall motion abnormalities are seen.     Right Ventricle-  Right ventricular function, chamber size, wall motion, and thickness are normal.     Atria-  Both atria appear normal.     Mitral Valve-  The mitral valve is normal.     Aortic Valve- Trace aortic insufficiency is present.     Tricuspid Valve-  trace tricuspid insufficiency is present. The peak velocity of the tricuspid  regurgitant jet is not obtainable. Pulmonary artery systolic pressure cannot be assessed.     Pulmonic Valve-  The pulmonic valve cannot be assessed.     Vessels-  The inferior vena cava was normal in size with preserved respiratory  variability. The aorta root is normal. Ascending aorta 3.5 cm. Sinuses of  Valsalva 3.8 cm. Estimated mean right atrial pressure is 3 mmHg.     Pericardium-  No pericardial effusion is present.     Miscellaneous-  Technically difficult study with poor acoustic windows.     Compared to Previous Study-  Previous study not available for comparison.     Attestation-  I have personally viewed the imaging and agree with the interpretation and  report as documented by the fellow, Darron Padilla MD, and/or edited by me.  ______________________________________________________________  MMode/2D Measurements & Calculations  IVSd: 0.92 cm  LVIDd: 5.3 cm  LVIDs: 3.7 cm  LVPWd: 0.92 cm  FS: 29.9 %  EDV(Teich): 136.5 ml  ESV(Teich): 59.3 ml  LV mass(C)d: 181.2 grams  LV mass(C)dI: 91.9 grams/m2  Ao root diam: 3.8 cm  asc Aorta Diam: 3.5 cm  LVOT diam: 2.3 cm  LVOT area: 4.2 cm2  LA Volume (BP): 32.2 ml     LA Volume Index (BP): 16.3 ml/m2  RWT: 0.35     Doppler Measurements & Calculations  MV E max ying: 44.6 cm/sec  MV A max ying:  60.6 cm/sec  MV E/A: 0.74  MV dec time: 0.29 sec  E/E' av.8  Lateral E/e': 6.3  Medial E/e': 9.3    US CAROTID BILATERAL 2017-   Clinical history: pre-cabg;  Comparison Study: None     Right side:   Plaque Morphology: Minimal smooth echogenic plaque in the carotid  bifurcation and proximal internal carotid artery.          Proximal CCA: 152/7.2 cm/sec     Mid CCA: 140/19 cm/sec     Distal CCA: 152/5.7 cm/sec     External CA: 153/5.4 cm/sec        Proximal ICA: 92/0.9  cm/sec     Mid ICA: 87/27  cm/sec     Distal ICA: 70/21  cm/sec     Vertebral artery: antegrade, 42/3.7  cm/sec     ICA/CCA ratio: 0.61      Left side:   Plaque: Minimal echogenic irregular plaque in the bifurcation and  minimal smooth echogenic plaque in the proximal internal carotid  artery.     CCA origin: 170/20  cm/sec     Mid CCA: 133/13 cm/sec     Distal CCA: 122/17 cm/sec     External CA: 170/0 cm/sec        Proximal ICA: 105/24  cm/sec     Mid ICA: 77/6  cm/sec     Distal ICA: 81/1.7  cm/sec     Vertebral artery: antegrade, 68/0  cm/sec      ICA/CCA ratio: 0.62     Impression:  1. Right side:  Degree of stenosis: Minimal smooth echogenic plaque in the bifurcation and proximal internal carotid  arteries corresponding to less than 50% internal carotid artery stenosis by velocity criteria.  2. Left side:   Degree of stenosis: Minimal echogenic plaque in the bifurcation and proximal internal carotid artery  corresponding to less than 50% internal carotid artery stenosis by velocity criteria.  3. Abnormal waveforms secondary to the presence of an intraaortic balloon pump.    CXR 17-   AP portable chest x-ray at 30 degrees. Median sternotomy  wires. Mediastinal surgical clips. Right PICC tip projecting over the  high SVC. Interval removal of left chest tube. Cardiac size within  normal limits. Left basilar linear atelectasis. Trace biapical pneumothoraces.    Impression:  1. Interval removal of left chest tube. Trace biapical  pneumothoraces.    DISCHARGE INSTRUCTIONS:  Avoid lifting anything greater than ten pounds for 6 weeks after surgery and then less than 20 pounds for an additional 6 weeks.    No driving for 4 weeks after surgery or while on pain medication.     Avoid strenuous activities such as bowling, vacuuming, raking, shoveling, golf or tennis for 12 weeks after your surgery. It is okay to resume sex if you feel comfortable in doing so. You may have to try different positions with your partner.     Splint your chest incision by hugging a pillow or bringing your arms across your chest when coughing or sneezing. Avoid pushing off with your arms when getting up for the first month if you have had your sternum opened.    Shower or wash your incisions daily with soap and water (or as instructed), pat dry. Keep wound clean and dry, showers are okay after discharge, but don't let spray hit directly on incision. No baths or swimming for 1 month.  Clean wounds twice a day for 2 weeks with microklenz spray if available. Cover chest tube sites with gauze until they stop draining, then leave open. It is not abnormal for chest tube sites to drain yellowish/clear fluid for up to 2-3 weeks after surgery.   Watch for signs of infection: increased redness, tenderness, warmth or any drainage that appears infected (pus like) or is persistent.  Also a temperature > 100.5 F or chills. Call your surgeon or primary care provider's office immediately. Remove any skin glue left on incisions after 10 days. This will not affect your incision and can speed up healing.    Exercise is very important in your recovery. Please follow the guidelines set up for you in your cardiac rehab classes at the hospital. If outpatient cardiac rehab was ordered for you, we highly recommend you participate. If you have problems arranging your cardiac rehab, please call [phone].     Avoid sitting for prolonged periods of time, try to walk every hour during the day. If  you have a leg incision, elevate your leg often when you are not walking.    Check your weight when you get home from the hospital and continue to check it daily through your recovery for at least a month. If you notice a weight gain of 2-3 pounds in a week, notify your primary care physician, cardiologist or surgeon.    Bowel activity may be slow after surgery. If necessary, you may take an over the counter laxative such as Milk of Magnesia or Miralax. You may have stool softeners prescribed (docusate sodium, Senokot). We recommend using stool softeners while using narcotics for pain (oxycodone/percocet, hydrocodone/vicodin).      REGARDING PRESCRIPTION REFILLS.  If you need a refill on your pain medication contact us.  All other medications will be adjusted, discontinued and re-filled by your primary care physician and/or your cardiologist as they were prior to your surgery. We have given you enough for one to three month with possibly one refill.    FOLLOW UP APPOINTMENTS:   You will now return to the care of your primary physician and your cardiologist.   It is important to call for an appointment to see your cardiologist in 2-3 weeks after surgery and see your primary care physician in 2-4 weeks after surgery. If there is a need to return to see CT Surgery please call our  at [phone].    PRE-ADMISSION MEDICATIONS:    No current facility-administered medications on file prior to encounter.   No current outpatient prescriptions on file prior to encounter.     DISCHARGE MEDICATIONS:    Luis Eduardo Oliver   Home Medication Instructions CHILANGO:[id number]    Printed on:11/22/17 1222   Medication Information                      acetaminophen (TYLENOL) 325 MG tablet  Take 2 tablets (650 mg) by mouth every 6 hours as needed for other (surgical pain)             aspirin 325 MG tablet  Take 1 tablet (325 mg) by mouth daily             ATORVASTATIN CALCIUM PO  Take 80 mg by mouth daily             calcium  carbonate (TUMS) 500 MG chewable tablet  Take 1 tablet (500 mg) by mouth 4-6 times daily as needed for reflux             FINASTERIDE PO  Take 1 mg by mouth daily             lisinopril (PRINIVIL/ZESTRIL) 2.5 MG tablet  Take 1 tablet (2.5 mg) by mouth daily             magnesium gluconate (MAGONATE) 500 MG tablet  Take 1 tablet (500 mg) by mouth daily for 14 days             metoprolol (LOPRESSOR) 50 MG tablet  Take 1 tablet (50 mg) by mouth every 12 hours             OMEPRAZOLE PO  Take 40 mg by mouth every morning Take 30 minutes before breakfast             oxyCODONE IR (ROXICODONE) 5 MG tablet  Take 1-2 tablets (5-10 mg) by mouth every 4 hours as needed for moderate to severe pain             PHENYTOIN SODIUM EXTENDED PO  Take 400 mg by mouth daily             senna-docusate (SENOKOT-S;PERICOLACE) 8.6-50 MG per tablet  Take 2 tablets by mouth 2 times daily as needed for constipation             TAMSULOSIN HCL PO  Take 0.4 mg by mouth At Bedtime For urination               CC:Criselda Zhang (Cardiologist, [facility])      [facility]   Cardiothoracic Surgery  Office phone: [phone]

## 2017-11-22 NOTE — PLAN OF CARE
Pt cleared for DC to home today. Incisions healing. Ambulating w/minimal SBA. Vss on RA. Pain controlled with tylenol q4hrs. New rxs to be filled at our pharmacy here. Wife present to provide transportation home.

## 2017-11-22 NOTE — PROVIDER NOTIFICATION
"D/I/A: Pt reporting CP at 1900 in L breast; described as \"pressure\" that had been present for past couple of hours with recent increase in discomfort, notes that inhaling does contribute to \"some\" additional discomfort.  Denies pain around CT removal site.   EKG ordered, NSR. Oxy given for pain. MARI Ibarra updated.     Plan to continue with pt tx to 6C; report given to NOLVIA Rea.  "

## 2017-11-22 NOTE — PLAN OF CARE
Problem: Cardiac Surgery (Adult)  Goal: Signs and Symptoms of Listed Potential Problems Will be Absent, Minimized or Managed (Cardiac Surgery)  Signs and symptoms of listed potential problems will be absent, minimized or managed by discharge/transition of care (reference Cardiac Surgery (Adult) CPG).     Transfer  D: Patient transferred from  this evening s/p CABx2 11/17. Had post op afib accompanied by SOB and hypotension, has been in NSR since converting with IV metoprolol.   I: Settled patient to 6C. Discussed calling for help before getting OOB.  A: Pleasant, cooperative,  AxOx4, but 4E report stated that he is sometimes 'forgetful' overnight, so bed alarm on while he sleeps. Denies the chest pain he reported earlier on 4E, but endorses incision pain, declined tylenol. Uses IS well. Voids using urinal, had BM last night. Last CT pulled earlier today, dressing intact.  P: Possible discharge to home ([facility]) tomorrow or Thursday.

## 2017-11-22 NOTE — PLAN OF CARE
Problem: Patient Care Overview  Goal: Plan of Care/Patient Progress Review  Physical Therapy Discharge Summary    Reason for therapy discharge:    Discharged to home with outpatient therapy.    Progress towards therapy goal(s). See goals on Care Plan in [facility] electronic health record for goal details.  Goals partially met.  Barriers to achieving goals:   discharge from facility.    Therapy recommendation(s):    Continued therapy is recommended.  Rationale/Recommendations:  to maximize strength, balance, activity tolerance/endurance, and mobility.

## 2017-11-22 NOTE — PROGRESS NOTES
CVTS Daily Note  11/22/2017  Attending: Rod Sanchez, *    S:   No overnight events.  Up from ICU yesterday.   Pt seen at bedside resting comfortably.    Does complain of some sternal pain at times, otherwise no acute complaints.      Denies F/C/Sweats.  No CP, SOB, or calf pain.    Tolerating diet.  + BM.  + Flatus.    Ambulated well with assistance.    Pain controlled well.    O:   Vitals:    11/22/17 0500 11/22/17 0611 11/22/17 0751 11/22/17 1100   BP: 106/57  125/66 117/62   BP Location: Left arm  Left arm Left arm   Resp: 18 18 18   Temp:   98.7  F (37.1  C) 98.4  F (36.9  C)   TempSrc:   Oral Oral   SpO2: 98%  97% 97%   Weight:  75.7 kg (166 lb 12.8 oz)     Height:         Vitals:    11/21/17 0400 11/21/17 2020 11/22/17 0611   Weight: 75.9 kg (167 lb 5.3 oz) 76.8 kg (169 lb 6.4 oz) 75.7 kg (166 lb 12.8 oz)     Intake/Output Summary (Last 24 hours) at 11/22/17 1223  Last data filed at 11/22/17 0900   Gross per 24 hour   Intake              840 ml   Output             1460 ml   Net             -620 ml       MAPs: 74 - 90  Gen: AAO x 3, pleasant, NAD  CV: RRR, S1S2 normal, no murmurs, rubs, or gallops.   Pulm: CTA, no rhonchi or wheezes  Abd: soft, non-tender, no guarding  Ext: trace peripheral edema, non-pitting  Incision: clean, dry, intact, no erythema  Chest Tube sites: dressings clean and dry    Labs:  BMP    Recent Labs  Lab 11/21/17  0346 11/20/17  0304 11/19/17  1443 11/19/17  0658 11/18/17  2131    136  --  135 132*   POTASSIUM 3.6 4.3 4.5 4.5 4.2   CHLORIDE 104 104  --  101 100   BEAN 8.1* 8.0*  --  8.2* 8.0*   CO2 30 27  --  28 26   BUN 24 27  --  23 23   CR 0.78 0.77  --  0.94 1.09   * 120*  --  116* 127*     CBC    Recent Labs  Lab 11/21/17  0346 11/20/17  0304 11/19/17  0658 11/18/17  2131   WBC 11.9* 13.8* 16.7* 18.5*   RBC 2.95* 2.96* 3.13* 3.30*   HGB 8.9* 8.9* 9.4* 10.0*   HCT 27.1* 26.9* 28.7* 30.3*   MCV 92 91 92 92   MCH 30.2 30.1 30.0 30.3   MCHC 32.8 33.1 32.8 33.0    RDW 13.2 13.1 13.3 13.1    127* 110* 106*     INR    Recent Labs  Lab 11/18/17  0339 11/17/17  1808 11/17/17  1630 11/17/17  0412   INR 1.42* 1.28* 1.46* 1.09      Hepatic Panel   Lab Results   Component Value Date    AST 18 11/16/2017     Lab Results   Component Value Date    ALT 37 11/16/2017     Lab Results   Component Value Date    ALBUMIN 3.2 11/16/2017     GLUCOSE:     Recent Labs  Lab 11/21/17  0734 11/21/17  0346 11/20/17  2133 11/20/17  1833 11/20/17  1202 11/20/17  0801 11/20/17  0304 11/19/17  2212  11/19/17  0658  11/18/17  2131  11/18/17  0339  11/17/17  1808   GLC  --  103*  --   --   --   --  120*  --   --  116*  --  127*  --  151*  --  153*   *  --  104* 98 117* 128*  --  129*  < >  --   < >  --   < >  --   < >  --    < > = values in this interval not displayed.      Imaging:  reviewed recent imaging      A/P:   Luis Edurado Oliver is a 77 year old male who is status post 2 vCABG by Dr. Sanchez/Jay on 11/17/17.      Neuro:   - Home phenytoin  - Scheduled Tylenol, PRN oxy, dilaudid     CV:   - Atorvastatin 80 mg,  mg  - Post-operative atrial fibrillation - POD 2 at 7am, transferred back to ICU for soft BPs and symptomatic with SOB. Converted with IV metoprolol, later that day, has remained in NSR since. No need for anticoagulation given less than 24 hours of afib. Will reconsider if recurs. Metoprolol increased to 50 mg BID 11/22     Pulm:   - Aggressive pulmonary toilet/ IS, encourage activity  - Left pleural tube removed 11/21.      GI / FEN:  - Home omeprazole, continue bowel regimen, +BM  - Cardiac diet      Renal / :   - Cr WNL at baseline, adequate UOP, on PTA finasteride and tamsulosin  - Given lasix 20 mg IV x1 11/20 with great response, net negative 2.8L yesterday, will replace K today, holding any further diuresis, weight below pre-op     Heme:   - Hgb 8.9, stable, no signs or symptoms of bleeding     Endo:   - BG , d/c SSI     ID: completed orlin-op abx  - WBC  trending down to 11.9, no signs or symptoms of infection     PPX:   - Omeprazole, Heparin subq     Dispo:   - Transfer to floor, has been floor status since 11/20,   - PT/OT recommending home with assist and OP cardiac rehab, possible d/c 11/22 or 11/23.       Discussed with CVTS Fellow   Staff surgeons have been informed of changes through both  verbal and written communication.      Hoang Belle PA-C  Cardiothoracic Surgery  Pager [phone]    12:23 PM   November 22, 2017

## 2017-11-22 NOTE — PLAN OF CARE
Problem: Patient Care Overview  Goal: Plan of Care/Patient Progress Review    Occupational Therapy Discharge Summary    Reason for therapy discharge:    Discharged to home with outpatient therapy.    Progress towards therapy goal(s). See goals on Care Plan in [facility] electronic health record for goal details.  Goals partially met.  Barriers to achieving goals:   discharge from facility.    Therapy recommendation(s):    Continued therapy is recommended.  Rationale/Recommendations:  Recommend discharge to home with assist prn and OP Phase II CR in [facility].  Continue home exercise program.

## 2017-11-22 NOTE — PLAN OF CARE
Problem: Patient Care Overview  Goal: Plan of Care/Patient Progress Review  Outcome: Improving  D: Pt s/p CABG x2 on 11/17, c/b episode of symptomatic afib.    I: Monitored vitals and assessed pt status.   PRN: tylenol    A: A0x4. VSS on RA. NSR. Afebrile. Denies any chest pain; mild shoulder pain controlled with PRN medication. Sternal incision C/D/I, open to air. Dressing over old CT site C/D/I. Good UOP. Up with SBA. R triple lumen PICC in place; R PIV removed this shift due to leaking. Pt slept between cares, making needs known. Plan for possible discharge today or tomorrow to home in [facility].    P: Continue to monitor pt status and report changes to treatment team.

## 2017-11-22 NOTE — PROGRESS NOTES
Care Coordinator- Discharge Planning Note     Admission Date/Time:  11/15/2017  Attending MD:  Rod Sanchez, *     Data  Chart reviewed, discussed with interdisciplinary team.   Patient was admitted for:   1. Coronary artery disease involving native coronary artery of native heart with unstable angina pectoris (H)    2. S/P CABG (coronary artery bypass graft)    3. Paroxysmal atrial fibrillation (H)         RNCC Intervention: Per discussion in interdisciplinary rounds, the primary team states that the patient is ready for discharge.  No complex needs identified.  RNCC will continue to follow and assess for needs.    Plan  Anticipated Discharge Date:  11/22/17  Anticipated Discharge Plan:  Home with OP cardiac rehab    CTS Handoff completed: yes  Sophia Nathan RN, BSN, PHN, RNCCPH: [phone]  Pager: [phone]  Covering for : Shanae Ordoñez, 6C RNCC

## 2017-11-22 NOTE — DISCHARGE INSTRUCTIONS
[facility]      AFTER YOU GO HOME FROM YOUR HEART SURGERY    Avoid lifting anything greater than ten pounds for 6 weeks after surgery and then less than 20 pounds for an additional 6 weeks.    No driving for 4 weeks after surgery or while on pain medication.     Avoid strenuous activities such as bowling, vacuuming, raking, shoveling, golf or tennis for 12 weeks after your surgery. It is okay to resume sex if you feel comfortable in doing so. You may have to try different positions with your partner.     Splint your chest incision by hugging a pillow or bringing your arms across your chest when coughing or sneezing. Avoid pushing off with your arms when getting up for the first month if you have had your sternum opened.    Shower or wash your incisions daily with soap and water (or as instructed), pat dry. Keep wound clean and dry, showers are okay after discharge, but don't let spray hit directly on incision. No baths or swimming for 1 month.  Clean wounds twice a day for 2 weeks with microklenz spray if available. Cover chest tube sites with gauze until they stop draining, then leave open. It is not abnormal for chest tube sites to drain yellowish/clear fluid for up to 2-3 weeks after surgery.   Watch for signs of infection: increased redness, tenderness, warmth or any drainage that appears infected (pus like) or is persistent.  Also a temperature > 100.5 F or chills. Call your surgeon or primary care provider's office immediately. Remove any skin glue left on incisions after 10 days. This will not affect your incision and can speed up healing.    Exercise is very important in your recovery. Please follow the guidelines set up for you in your cardiac rehab classes at the hospital. If outpatient cardiac rehab was ordered for you, we highly recommend you participate. If you have problems arranging your cardiac rehab, please call [phone].     Avoid sitting for prolonged periods of  time, try to walk every hour during the day. If you have a leg incision, elevate your leg often when you are not walking.    Check your weight when you get home from the hospital and continue to check it daily through your recovery for at least a month. If you notice a weight gain of 2-3 pounds in a week, notify your primary care physician, cardiologist or surgeon.    Bowel activity may be slow after surgery. If necessary, you may take an over the counter laxative such as Milk of Magnesia or Miralax. You may have stool softeners prescribed (docusate sodium, Senokot). We recommend using stool softeners while using narcotics for pain (oxycodone/percocet, hydrocodone/vicodin).      REGARDING PRESCRIPTION REFILLS.  If you need a refill on your pain medication contact us.  All other medications will be adjusted, discontinued and re-filled by your primary care physician and/or your cardiologist as they were prior to your surgery. We have given you enough for one to three month with possibly one refill.    POST-OPERATIVE CLINIC VISITS  You will now return to the care of your primary physician and your cardiologist.   It is important to call for an appointment to see your cardiologist in 2-3 weeks after surgery and see your primary care physician in 2-4 weeks after surgery. If there is a need to return to see CT Surgery please call our  at [phone].    SURGICAL QUESTIONS  Please call Sarah May with any surgical questions, her phone number is listed below.  She can assist you with your needs and contact other surgery care team members as indicated.    For general questions or concerns, please call the Cardiothoracic Surgery Department at [phone] 8-4:30 M-F.   On weekends or after hours, please call [phone] and ask the  to   page the Cardiothoracic Surgery fellow on call.      Thank you,    Your Cardiothoracic Surgery Team  Sarah May RN Care Coordinator-  [phone]   Cheri  Ervin Gonzalez PA-C

## 2017-11-22 NOTE — PLAN OF CARE
Problem: Patient Care Overview  Goal: Plan of Care/Patient Progress Review  OT/CR 6C: Pt demonstrating good progress with therapy today.  Less anxiety resulted improved activity tolerance.  Pt able to navigate 12 stairs with mod I using 1 handrail with VSS on RA.  Pt tolerates walking approx 850ft with 1 seated rest.  Pt ambulating well without AD today, no LOB.  SBA-Mod I with bed mobility, transfers and toileting.     Discharge Planner OT   Patient plan for discharge: home with assist prn and OP CR   Current status: see above for details  Barriers to return to prior living situation: none identified  Recommendations for discharge: Home with assist prn and OP Phase II CR at [facility]. - orders placed and information provided to pt  Rationale for recommendations: Pt will benefit from OP CR to maximize activity tolerance and cardiac outcomes post surgically.       Entered by: Marion Burkett 11/22/2017 10:36 AM

## 2017-11-23 NOTE — PLAN OF CARE
Problem: Patient Care Overview  Goal: Plan of Care/Patient Progress Review  DISCHARGE                         11/22/2017  3:46 PM  ----------------------------------------------------------------------------  Discharged to: Home  Via: Automobile  Accompanied by: Family  Discharge Instructions: diet, activity, medications, follow up appointments, when to call the MD, aftercare instructions, and what to watchout for (i.e. s/s of infection, increasing SOB, palpitations, chest pain,)  Prescriptions: To be filled by [facility] pharmacy per pt's request; medication list reviewed & sent with pt  Follow Up Appointments: arranged; information given  Belongings: All sent with pt  IV: out  Telemetry: off  Pt exhibits understanding of above discharge instructions; all questions answered.    Discharge Paperwork: Signed, copied, and sent home with patient.

## 2017-11-27 NOTE — OP NOTE
DATE OF OPERATION:  11/17/2017.      PREOPERATIVE DIAGNOSIS:  Coronary artery disease.      POSTOPERATIVE DIAGNOSIS:  Coronary artery disease.      PROCEDURES:   1.  Coronary artery bypass grafting x2 (left internal mammary artery to second diagonal artery, saphenous vein graft to obtuse marginal artery).   2.  Endoscopic vein harvest.      SURGEON:  Rod Sanchez MD      CO-SURGEON:  David Cedeño MD      NOTE:  A second attending surgeon required for the operation because of the absence of any qualified resident or fellow.      OPERATIVE INDICATIONS:  Luis Eduardo Mckeon is a 77-year-old gentleman with significant left main stenosis with coronary artery bypass grafting.  I discussed the benefits with the patient and family.  He understands and is willing to proceed with surgery.      OPERATIVE FINDINGS:  Please refer to the completed operative note dictated by Dr. Rod Sanchez.      DESCRIPTION OF PROCEDURE:  The patient brought in the operating suite and consent obtained.  Following the general anesthesia, the patient's abdomen and lower extremities prepped and draped in the usual manner.  A short segment of saphenous vein was harvested from the left lower extremity using endoscopic techniques.  Simultaneously, a median sternotomy was performed.  The left internal mammary artery was harvested from the left chest wall.  Preparation of cardiopulmonary bypass included ACT-guided heparinization and administration of heparin.  Coronary artery bypass grafting with left internal mammary artery bypass to the left anterior descending artery, saphenous vein graft to the distal obtuse marginal artery was performed in a standard fashion. The rest of the operation was performed in usual fashion.  Following termination of cardiopulmonary bypass, there was normal LV  function.         DAVID CEDEÑO MD             D: 11/26/2017 22:14   T: 11/27/2017 02:43   MT: lg      Name:     LUIS EDUARDO MCKEON   MRN:      [id number]         Account:        [id number]   :      1940           Procedure Date: 2017      Document: [id number]       cc: [facility] Surgery Billing

## (undated) DEVICE — DRAIN CHEST TUBE 32FR STR 8032

## (undated) DEVICE — SOL NACL 0.9% 10ML VIAL 0409-4888-02

## (undated) DEVICE — SOL NACL 0.9% IRRIG 1000ML BOTTLE 2F7124

## (undated) DEVICE — CLIP SPRING FOGARTY SOFTJAW CSOFT6

## (undated) DEVICE — ESU PENCIL W/COATED BLADE E2450H

## (undated) DEVICE — SPONGE LAP 12X12" X8425

## (undated) DEVICE — SU PROLENE 6-0 C-1DA 4X24" M8726

## (undated) DEVICE — ESU ELEC BLADE 2.75" COATED/INSULATED E1455

## (undated) DEVICE — SU VICRYL 2-0 CT-1 27" UND J259H

## (undated) DEVICE — PACK ADULT HEART UMMC PV15CG92D

## (undated) DEVICE — LABEL MEDICATION SYSTEM 3303-P

## (undated) DEVICE — PROTECTOR ARM ONE-STEP TRENDELENBURG 40418

## (undated) DEVICE — SU PROLENE 8-0 BV130-5DA 24" 8732H

## (undated) DEVICE — SU STEEL MYO/WIRE II STERNOTOMY 8 BE-1 3X14" 048-217

## (undated) DEVICE — SUCTION CATH AIRLIFE TRI-FLO W/CONTROL PORT 14FR  T60C

## (undated) DEVICE — SOL NACL 0.9% INJ 1000ML BAG 07983-09

## (undated) DEVICE — SU PROLENE 7-0 BV-1DA 4X24" M8702

## (undated) DEVICE — SU ETHIBOND 2-0 SHDA 30" X563H

## (undated) DEVICE — SU PROLENE 6-0 C-1DA 30" 8706H

## (undated) DEVICE — DECANTER BAG 2002S

## (undated) DEVICE — SYR 01ML 27GA 0.5" NDL TBC 309623

## (undated) DEVICE — DRSG DRAIN 4X4" 7086

## (undated) DEVICE — CLIP HORIZON SM RED WIDE SLOT 001201

## (undated) DEVICE — LINEN TOWEL PACK X6 WHITE 5487

## (undated) DEVICE — SU PROLENE 3-0 SHDA 36" 8522H

## (undated) DEVICE — ANTIFOG SOLUTION W/FOAM PAD 31142527

## (undated) DEVICE — SU PROLENE 5-0 RB-2DA 30" 8710H

## (undated) DEVICE — SU STEEL 6 CCS 4X18" M654G

## (undated) DEVICE — GLOVE PROTEXIS POWDER FREE SMT 7.5  2D72PT75X

## (undated) DEVICE — BLADE KNIFE BEAVER MICROSHARP GREEN 377515

## (undated) DEVICE — TAPE MEDIPORE 4"X2YD 2864

## (undated) DEVICE — BLADE SAW STERNAL 20X30MM KM-32

## (undated) DEVICE — TIES BANDING T50R

## (undated) DEVICE — DRAPE SLUSH/WARMER 66X44" ORS-320

## (undated) DEVICE — SU PLEDGET SOFT TFE 13MMX7MMX1.5MM D7044

## (undated) DEVICE — SU PROLENE 4-0 RB-1DA 36" 8557H

## (undated) DEVICE — KIT ENDO VASOVIEW HEMOPRO 2 VH-4000

## (undated) DEVICE — SU PLEDGET SOFT TFE 3/8"X3/26"X1/16" PCP40

## (undated) DEVICE — CLIP HORIZON LG ORANGE 004200

## (undated) DEVICE — CLIP HORIZON MED BLUE 002200

## (undated) DEVICE — DRAIN CHEST TUBE RIGHT ANGLED 28FR 8128

## (undated) DEVICE — SU ETHIBOND 0 CT-1 CR 8X18" CX21D

## (undated) DEVICE — PREP CHLORAPREP 26ML TINTED ORANGE  260815

## (undated) DEVICE — SU VICRYL 3-0 FS-1 27" J442H

## (undated) DEVICE — ESU HOLSTER PLASTIC DISP E2400

## (undated) DEVICE — SU ETHIBOND 0 TIE 6X30" X306H

## (undated) DEVICE — BLADE KNIFE SURG 15C 371716

## (undated) DEVICE — SPONGE LAP 18X18" X8435

## (undated) DEVICE — WAND SUCTION LP SOFT 15.2CM SU-22702

## (undated) DEVICE — DRSG TELFA 3"X8" NON25720

## (undated) DEVICE — SPECIMEN CONTAINER 5OZ STERILE 2600SA

## (undated) DEVICE — SUCTION MANIFOLD DORNOCH ULTRA CART UL-CL500

## (undated) DEVICE — TUBING INSUFFLATION W/FILTER CPC TO LUER 620-030-301

## (undated) DEVICE — CANNULA VESSEL DLP  30001

## (undated) DEVICE — DRAPE IOBAN INCISE 23X17" 6650EZ

## (undated) DEVICE — BLADE CLIPPER SGL USE 9680

## (undated) DEVICE — SU DERMABOND ADVANCED .7ML DNX12

## (undated) DEVICE — CONNECTOR DRAIN CHEST Y EXTENSION SET 19909

## (undated) DEVICE — SU PROLENE 4-0 SHDA 36" 8521H

## (undated) DEVICE — BLADE KNIFE BEAVER MINI STR BEAVER6900

## (undated) DEVICE — BNDG ELASTIC 6"X5YDS STERILE 6611-6S

## (undated) DEVICE — DEFIB PRO-PADZ LVP LQD GEL ADULT 8900-2105-01

## (undated) DEVICE — SU VICRYL 0 CTX 36" J370H

## (undated) DEVICE — WIPES FOLEY CARE SURESTEP PROVON DFC100

## (undated) DEVICE — SU RETRACT-O-TAPE 1041

## (undated) DEVICE — LINEN TOWEL PACK X30 5481

## (undated) DEVICE — LEAD ELEC MYOCARDIO PACING TEMPORARY MEDTRONIC

## (undated) DEVICE — SU ETHIBOND 3-0 BBDA 36" X588H

## (undated) RX ORDER — PROTAMINE SULFATE 10 MG/ML
INJECTION, SOLUTION INTRAVENOUS
Status: DISPENSED
Start: 2017-11-17

## (undated) RX ORDER — FENTANYL CITRATE 50 UG/ML
INJECTION, SOLUTION INTRAMUSCULAR; INTRAVENOUS
Status: DISPENSED
Start: 2017-11-17

## (undated) RX ORDER — CEFAZOLIN SODIUM 1 G/3ML
INJECTION, POWDER, FOR SOLUTION INTRAMUSCULAR; INTRAVENOUS
Status: DISPENSED
Start: 2017-11-17

## (undated) RX ORDER — HEPARIN SODIUM 1000 [USP'U]/ML
INJECTION, SOLUTION INTRAVENOUS; SUBCUTANEOUS
Status: DISPENSED
Start: 2017-11-17

## (undated) RX ORDER — LIDOCAINE HYDROCHLORIDE 20 MG/ML
INJECTION, SOLUTION EPIDURAL; INFILTRATION; INTRACAUDAL; PERINEURAL
Status: DISPENSED
Start: 2017-11-17

## (undated) RX ORDER — PROPOFOL 10 MG/ML
INJECTION, EMULSION INTRAVENOUS
Status: DISPENSED
Start: 2017-11-17

## (undated) RX ORDER — EPHEDRINE SULFATE 50 MG/ML
INJECTION, SOLUTION INTRAMUSCULAR; INTRAVENOUS; SUBCUTANEOUS
Status: DISPENSED
Start: 2017-11-17

## (undated) RX ORDER — ROCURONIUM BROMIDE 50 MG/5 ML
SYRINGE (ML) INTRAVENOUS
Status: DISPENSED
Start: 2017-11-17

## (undated) RX ORDER — ONDANSETRON 2 MG/ML
INJECTION INTRAMUSCULAR; INTRAVENOUS
Status: DISPENSED
Start: 2017-11-17

## (undated) RX ORDER — PHENYLEPHRINE HCL IN 0.9% NACL 1 MG/10 ML
SYRINGE (ML) INTRAVENOUS
Status: DISPENSED
Start: 2017-11-17

## (undated) RX ORDER — LIDOCAINE HYDROCHLORIDE 10 MG/ML
INJECTION, SOLUTION EPIDURAL; INFILTRATION; INTRACAUDAL; PERINEURAL
Status: DISPENSED
Start: 2017-11-15